# Patient Record
Sex: FEMALE | Race: WHITE
[De-identification: names, ages, dates, MRNs, and addresses within clinical notes are randomized per-mention and may not be internally consistent; named-entity substitution may affect disease eponyms.]

---

## 2017-09-24 ENCOUNTER — HOSPITAL ENCOUNTER (EMERGENCY)
Dept: HOSPITAL 8 - ED | Age: 27
Discharge: HOME | End: 2017-09-24
Payer: COMMERCIAL

## 2017-09-24 VITALS — DIASTOLIC BLOOD PRESSURE: 96 MMHG | SYSTOLIC BLOOD PRESSURE: 137 MMHG

## 2017-09-24 VITALS — BODY MASS INDEX: 23.21 KG/M2 | HEIGHT: 66 IN | WEIGHT: 144.4 LBS
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
## 2017-09-24 DIAGNOSIS — F32.9: ICD-10-CM
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
## 2017-09-24 DIAGNOSIS — S00.12XA: Primary | ICD-10-CM
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
## 2017-09-24 PROCEDURE — 70486 CT MAXILLOFACIAL W/O DYE: CPT

## 2017-09-24 PROCEDURE — 99284 EMERGENCY DEPT VISIT MOD MDM: CPT

## 2018-08-06 ENCOUNTER — HOSPITAL ENCOUNTER (OUTPATIENT)
Dept: RADIOLOGY | Facility: MEDICAL CENTER | Age: 28
End: 2018-08-06
Attending: NEUROLOGICAL SURGERY
Payer: COMMERCIAL

## 2018-08-06 DIAGNOSIS — R51.9 FACIAL PAIN: ICD-10-CM

## 2018-08-06 PROCEDURE — 70551 MRI BRAIN STEM W/O DYE: CPT

## 2018-08-06 PROCEDURE — 70540 MRI ORBIT/FACE/NECK W/O DYE: CPT

## 2018-08-10 ENCOUNTER — HOSPITAL ENCOUNTER (OUTPATIENT)
Facility: MEDICAL CENTER | Age: 28
End: 2018-08-10
Attending: PHYSICIAN ASSISTANT
Payer: COMMERCIAL

## 2018-08-10 ENCOUNTER — OFFICE VISIT (OUTPATIENT)
Dept: URGENT CARE | Facility: CLINIC | Age: 28
End: 2018-08-10
Payer: COMMERCIAL

## 2018-08-10 VITALS
OXYGEN SATURATION: 97 % | DIASTOLIC BLOOD PRESSURE: 76 MMHG | TEMPERATURE: 98.3 F | WEIGHT: 146.4 LBS | HEART RATE: 124 BPM | SYSTOLIC BLOOD PRESSURE: 120 MMHG | RESPIRATION RATE: 16 BRPM | BODY MASS INDEX: 24.36 KG/M2

## 2018-08-10 DIAGNOSIS — N76.0 ACUTE VAGINITIS: ICD-10-CM

## 2018-08-10 DIAGNOSIS — B00.9 HERPES INFECTION: ICD-10-CM

## 2018-08-10 LAB
APPEARANCE UR: CLEAR
BILIRUB UR STRIP-MCNC: NORMAL MG/DL
COLOR UR AUTO: YELLOW
GLUCOSE UR STRIP.AUTO-MCNC: NORMAL MG/DL
KETONES UR STRIP.AUTO-MCNC: 15 MG/DL
LEUKOCYTE ESTERASE UR QL STRIP.AUTO: NORMAL
NITRITE UR QL STRIP.AUTO: NORMAL
PH UR STRIP.AUTO: 6 [PH] (ref 5–8)
PROT UR QL STRIP: NORMAL MG/DL
RBC UR QL AUTO: NORMAL
SP GR UR STRIP.AUTO: 1.02
UROBILINOGEN UR STRIP-MCNC: NORMAL MG/DL

## 2018-08-10 PROCEDURE — 99214 OFFICE O/P EST MOD 30 MIN: CPT | Performed by: PHYSICIAN ASSISTANT

## 2018-08-10 PROCEDURE — 87086 URINE CULTURE/COLONY COUNT: CPT

## 2018-08-10 PROCEDURE — 87480 CANDIDA DNA DIR PROBE: CPT

## 2018-08-10 PROCEDURE — 87510 GARDNER VAG DNA DIR PROBE: CPT

## 2018-08-10 PROCEDURE — 87660 TRICHOMONAS VAGIN DIR PROBE: CPT

## 2018-08-10 PROCEDURE — 87491 CHLMYD TRACH DNA AMP PROBE: CPT

## 2018-08-10 PROCEDURE — 87529 HSV DNA AMP PROBE: CPT | Mod: 91

## 2018-08-10 PROCEDURE — 87529 HSV DNA AMP PROBE: CPT

## 2018-08-10 PROCEDURE — 87591 N.GONORRHOEAE DNA AMP PROB: CPT

## 2018-08-10 PROCEDURE — 81002 URINALYSIS NONAUTO W/O SCOPE: CPT | Performed by: PHYSICIAN ASSISTANT

## 2018-08-10 RX ORDER — AZITHROMYCIN 500 MG/1
1000 TABLET, FILM COATED ORAL ONCE
Qty: 2 TAB | Refills: 0 | Status: SHIPPED | OUTPATIENT
Start: 2018-08-10 | End: 2018-08-10

## 2018-08-10 RX ORDER — CEFTRIAXONE 1 G/1
250 INJECTION, POWDER, FOR SOLUTION INTRAMUSCULAR; INTRAVENOUS ONCE
Status: COMPLETED | OUTPATIENT
Start: 2018-08-10 | End: 2018-08-10

## 2018-08-10 RX ORDER — VALACYCLOVIR HYDROCHLORIDE 1 G/1
1000 TABLET, FILM COATED ORAL 2 TIMES DAILY
Qty: 14 TAB | Refills: 0 | Status: SHIPPED | OUTPATIENT
Start: 2018-08-10 | End: 2018-08-17

## 2018-08-10 RX ADMIN — CEFTRIAXONE 250 MG: 1 INJECTION, POWDER, FOR SOLUTION INTRAMUSCULAR; INTRAVENOUS at 17:09

## 2018-08-10 ASSESSMENT — ENCOUNTER SYMPTOMS
VOMITING: 0
CHILLS: 0
DIARRHEA: 0
ABDOMINAL PAIN: 0
FEVER: 0
NAUSEA: 0

## 2018-08-10 ASSESSMENT — PAIN SCALES - GENERAL: PAINLEVEL: 5=MODERATE PAIN

## 2018-08-10 NOTE — PROGRESS NOTES
Subjective:   Brittany Parker is a 28 y.o. female who presents for Rash (noticed them on wednesday, bumps outside of vaginal area )   2 day history painful lesion around vagina. Patient denies new sexual partners. She is with a partner that she has been with for quite some time. She has not noticed any lesions on the penis. They do not use condoms.    The patient denies any vaginal discharge, abdominal pain, , urgency or frequency. She does report burning with urination. Denies fever.        Rash   This is a new problem. Pertinent negatives include no diarrhea, fever or vomiting.     Review of Systems   Constitutional: Negative for chills, fever and malaise/fatigue.   Gastrointestinal: Negative for abdominal pain, diarrhea, nausea and vomiting.   Genitourinary: Negative for dysuria, frequency and urgency.   Skin: Positive for rash.   All other systems reviewed and are negative.    No Known Allergies     Objective:   /76   Pulse (!) 124   Temp 36.8 °C (98.3 °F)   Resp 16   Wt 66.4 kg (146 lb 6.4 oz)   SpO2 97%   Breastfeeding? No   BMI 24.36 kg/m²   Physical Exam   Constitutional: She is oriented to person, place, and time. She appears well-developed and well-nourished.   HENT:   Head: Normocephalic and atraumatic.   Right Ear: External ear normal.   Left Ear: External ear normal.   Nose: Nose normal.   Mouth/Throat: Oropharynx is clear and moist. No oropharyngeal exudate.   Eyes: Pupils are equal, round, and reactive to light. Conjunctivae and EOM are normal.   Neck: Normal range of motion. Neck supple.   Cardiovascular: Normal rate, regular rhythm and normal heart sounds.  Exam reveals no gallop and no friction rub.    No murmur heard.  Pulmonary/Chest: Effort normal and breath sounds normal. No respiratory distress. She has no wheezes. She has no rales.   Abdominal: Soft. Bowel sounds are normal. She exhibits no distension and no mass. There is no tenderness. There is no rebound and no guarding.      Genitourinary: Rectum normal and uterus normal.       There is lesion on the right labia. Cervix exhibits motion tenderness and discharge. Right adnexum displays no mass, no tenderness and no fullness. Left adnexum displays no mass and no tenderness. There is tenderness in the vagina. Vaginal discharge found.   Genitourinary Comments: Multiple vesicular lesions about the labia and just inside the vagina  Copious yellow foul odor vaginal discharge noted as well as from the cervix.   Musculoskeletal: Normal range of motion. She exhibits no edema, tenderness or deformity.   Lymphadenopathy:     She has no cervical adenopathy.   Neurological: She is alert and oriented to person, place, and time.   Skin: Skin is warm and dry. No rash noted.   Psychiatric: She has a normal mood and affect. Judgment normal.           Assessment/Plan:   Assessment    1. Herpes infection  - RPR  - HIV AG/AB COMBO ASSAY SCREENING; Future  - HEPATITIS PANEL ACUTE (4 COMPONENTS)  - valacyclovir (VALTREX) 1 GM Tab; Take 1 Tab by mouth 2 times a day for 7 days.  Dispense: 14 Tab; Refill: 0  - POCT Urinalysis  - URINE CULTURE(NEW); Future  - VAGINAL PATHOGENS DNA PANEL; Future  - HERPES SCREEN VIRAL CULTURE    Physical exam findings are consistent with herpes simplex virus. Viral culture obtained. She also does have significant vaginal discharge concerning for possible chlamydia or gonorrhea. Swabs are obtained for identification. Patient will be treated with Valtrex as well as a one-time dose of Rocephin and 1 g of Zithromax. She is encouraged to use condoms until results are available. Printed information regarding herpes virus infection given. She does inquire about the use of tampons with her period and is wondering if she could just give her. Recommend starting her new pack of pills. I did review with her that this would be acceptable for this cycle.    She also is agreeable to do labs to check for syphilis, HIV and hepatitis. Ordered and  orders given to patient.      2. Acute vaginitis  - CHLAMYDIA/GC PCR URINE OR SWAB; Future  - RPR  - HIV AG/AB COMBO ASSAY SCREENING; Future  - HEPATITIS PANEL ACUTE (4 COMPONENTS)  - cefTRIAXone (ROCEPHIN) injection 250 mg; 250 mg by Intramuscular route Once.  - azithromycin (ZITHROMAX) 500 MG tablet; Take 2 Tabs by mouth Once for 1 dose.  Dispense: 2 Tab; Refill: 0  - POCT Urinalysis  - URINE CULTURE(NEW); Future  - VAGINAL PATHOGENS DNA PANEL; Future  - HERPES SCREEN VIRAL CULTURE    Differential diagnosis, natural history, supportive care, and indications for immediate follow-up discussed.    If not improving in 3-5 days, F/U with PCP or return to  or sooner if worsens  Strict ER precautions given.    Please note that this note was created using voice recognition speech to text software. Every effort has been made to correct obvious errors.  However, I expect there are errors of grammar and possibly context that were not discovered prior to finalizing the note

## 2018-08-10 NOTE — PATIENT INSTRUCTIONS
Genital Herpes  Genital herpes is a common sexually transmitted infection (STI) that is caused by a virus. The virus is spread from person to person through sexual contact. Infection can cause itching, blisters, and sores in the genital area or rectal area. This is called an outbreak. It affects both men and women.  Genital herpes is particularly concerning for pregnant women because the virus can be passed to the baby during delivery and cause serious problems. Genital herpes is also a concern for people with a weakened defense (immune) system.  Symptoms of genital herpes may last several days and then go away. However, the virus remains in your body, so you may have more outbreaks of symptoms in the future. The time between outbreaks varies and can be months or years.  CAUSES  Genital herpes is caused by a virus called herpes simplex virus (HSV) type 2 or HSV type 1. These viruses are contagious and are most often spread through sexual contact with an infected person. Sexual contact includes vaginal, anal, and oral sex.  RISK FACTORS  Risk factors for genital herpes include:  · Being sexually active with multiple partners.  · Having unprotected sex.  SIGNS AND SYMPTOMS  Symptoms may include:  · Pain and itching in the genital area or rectal area.  · Small red bumps that turn into blisters and then turn into sores.  · Flu-like symptoms, including:  ¨ Fever.  ¨ Body aches.  · Painful urination.  · Vaginal discharge.  DIAGNOSIS  Genital herpes may be diagnosed by:  · Physical exam.  · Blood test.  · Fluid culture test from an open sore.  TREATMENT  There is no cure for genital herpes. Oral antiviral medicines may be used to speed up healing and to help prevent the return of symptoms. These medicines can also help to reduce the spread of the virus to sexual partners.  HOME CARE INSTRUCTIONS  · Keep the affected areas dry and clean.  · Take medicines only as directed by your health care provider.  · Do not have sexual  contact during active infections. Genital herpes is contagious.  · Practice safe sex. Latex condoms and female condoms may help to prevent the spread of the herpes virus.  · Avoid rubbing or touching the blisters and sores. If you do touch the blister or sores:  ¨ Wash your hands thoroughly.  ¨ Do not touch your eyes afterward.  · If you become pregnant, tell your health care provider if you have had genital herpes.  · Keep all follow-up visits as directed by your health care provider. This is important.  PREVENTION  · Use condoms. Although anyone can contract genital herpes during sexual contact even with the use of a condom, a condom can provide some protection.  · Avoid having multiple sexual partners.  · Talk to your sexual partner about any symptoms and past history that either of you may have.  · Get tested before you have sex. Ask your partner to do the same.  · Recognize the symptoms of genital herpes. Do not have sexual contact if you notice these symptoms.  SEEK MEDICAL CARE IF:  · Your symptoms are not improving with medicine.  · Your symptoms return.  · You have new symptoms.  · You have a fever.  · You have abdominal pain.  · You have redness, swelling, or pain in your eye.  MAKE SURE YOU:  · Understand these instructions.  · Will watch your condition.  · Will get help right away if you are not doing well or get worse.  This information is not intended to replace advice given to you by your health care provider. Make sure you discuss any questions you have with your health care provider.  Document Released: 12/15/2001 Document Revised: 01/08/2016 Document Reviewed: 05/05/2015  [employer] Interactive Patient Education © 2017 [employer]

## 2018-08-11 DIAGNOSIS — B00.9 HERPES INFECTION: ICD-10-CM

## 2018-08-11 DIAGNOSIS — N76.0 ACUTE VAGINITIS: ICD-10-CM

## 2018-08-11 LAB
C TRACH DNA SPEC QL NAA+PROBE: NEGATIVE
CANDIDA DNA VAG QL PROBE+SIG AMP: NEGATIVE
G VAGINALIS DNA VAG QL PROBE+SIG AMP: POSITIVE
N GONORRHOEA DNA SPEC QL NAA+PROBE: NEGATIVE
SPECIMEN SOURCE: NORMAL
T VAGINALIS DNA VAG QL PROBE+SIG AMP: NEGATIVE

## 2018-08-11 PROCEDURE — 87529 HSV DNA AMP PROBE: CPT

## 2018-08-13 LAB
BACTERIA UR CULT: NORMAL
SIGNIFICANT IND 70042: NORMAL
SITE SITE: NORMAL
SOURCE SOURCE: NORMAL

## 2018-08-14 RX ORDER — CLINDAMYCIN HYDROCHLORIDE 300 MG/1
300 CAPSULE ORAL 2 TIMES DAILY
Qty: 14 CAP | Refills: 0 | Status: SHIPPED | OUTPATIENT
Start: 2018-08-14 | End: 2018-08-21

## 2018-08-15 LAB — MISCELLANEOUS LAB RESULT MISCLAB: ABNORMAL

## 2018-08-16 LAB
HSV1 DNA CSF QL NAA+PROBE: NOT DETECTED
HSV2 DNA CSF QL NAA+PROBE: DETECTED
SPECIMEN SOURCE: ABNORMAL

## 2018-08-17 ENCOUNTER — TELEPHONE (OUTPATIENT)
Dept: URGENT CARE | Facility: PHYSICIAN GROUP | Age: 28
End: 2018-08-17

## 2018-08-17 NOTE — TELEPHONE ENCOUNTER
Contacted patient with results of viral culture + for HSV 2. She is feeling better with the Valtrex. Results of RPR, HIV and Hepatitis panel are still pending. Will ask staff to check on the status of this.

## 2021-03-04 ENCOUNTER — TELEMEDICINE (OUTPATIENT)
Dept: MEDICAL GROUP | Facility: MEDICAL CENTER | Age: 31
End: 2021-03-04
Attending: NURSE PRACTITIONER

## 2021-03-04 DIAGNOSIS — Z76.89 DIAGNOSTIC INTERVIEW ENCOUNTER: ICD-10-CM

## 2021-03-04 PROCEDURE — 99999 PR NO CHARGE: CPT | Mod: 95 | Performed by: NURSE PRACTITIONER

## 2021-03-05 ENCOUNTER — HOSPITAL ENCOUNTER (INPATIENT)
Dept: HOSPITAL 8 - LDOP | Age: 31
LOS: 2 days | Discharge: HOME | End: 2021-03-07
Attending: OBSTETRICS & GYNECOLOGY | Admitting: OBSTETRICS & GYNECOLOGY
Payer: MEDICAID

## 2021-03-05 VITALS — BODY MASS INDEX: 26.89 KG/M2 | HEIGHT: 66 IN | WEIGHT: 167.33 LBS

## 2021-03-05 VITALS — SYSTOLIC BLOOD PRESSURE: 125 MMHG | DIASTOLIC BLOOD PRESSURE: 83 MMHG

## 2021-03-05 VITALS — DIASTOLIC BLOOD PRESSURE: 76 MMHG | SYSTOLIC BLOOD PRESSURE: 109 MMHG

## 2021-03-05 DIAGNOSIS — Z3A.39: ICD-10-CM

## 2021-03-05 LAB
BASOPHILS # BLD AUTO: 0.3 X10^3/UL (ref 0–0.1)
BASOPHILS NFR BLD AUTO: 1 % (ref 0–1)
EOSINOPHIL # BLD AUTO: 0.1 X10^3/UL (ref 0–0.4)
EOSINOPHIL NFR BLD AUTO: 0 % (ref 1–7)
ERYTHROCYTE [DISTWIDTH] IN BLOOD BY AUTOMATED COUNT: 13.5 % (ref 9.6–15.2)
LYMPHOCYTES # BLD AUTO: 2.9 X10^3/UL (ref 1–3.4)
LYMPHOCYTES NFR BLD AUTO: 14 % (ref 22–44)
MCH RBC QN AUTO: 30.4 PG (ref 27–34.8)
MCHC RBC AUTO-ENTMCNC: 34.6 G/DL (ref 32.4–35.8)
MD: (no result)
MICROSCOPIC: (no result)
MONOCYTES # BLD AUTO: 0.9 X10^3/UL (ref 0.2–0.8)
MONOCYTES NFR BLD AUTO: 5 % (ref 2–9)
NEUTROPHILS # BLD AUTO: 16.2 X10^3/UL (ref 1.8–6.8)
NEUTROPHILS NFR BLD AUTO: 79 % (ref 42–75)
PLATELET # BLD AUTO: 324 X10^3/UL (ref 130–400)
PMV BLD AUTO: 8.4 FL (ref 7.4–10.4)
RBC # BLD AUTO: 4.23 X10^6/UL (ref 3.82–5.3)

## 2021-03-05 PROCEDURE — 85025 COMPLETE CBC W/AUTO DIFF WBC: CPT

## 2021-03-05 PROCEDURE — 86592 SYPHILIS TEST NON-TREP QUAL: CPT

## 2021-03-05 PROCEDURE — 81001 URINALYSIS AUTO W/SCOPE: CPT

## 2021-03-05 PROCEDURE — 87635 SARS-COV-2 COVID-19 AMP PRB: CPT

## 2021-03-05 PROCEDURE — 80307 DRUG TEST PRSMV CHEM ANLYZR: CPT

## 2021-03-05 PROCEDURE — 00HU33Z INSERTION OF INFUSION DEVICE INTO SPINAL CANAL, PERCUTANEOUS APPROACH: ICD-10-PCS | Performed by: OBSTETRICS & GYNECOLOGY

## 2021-03-05 PROCEDURE — 3E0R3BZ INTRODUCTION OF ANESTHETIC AGENT INTO SPINAL CANAL, PERCUTANEOUS APPROACH: ICD-10-PCS | Performed by: OBSTETRICS & GYNECOLOGY

## 2021-03-05 PROCEDURE — 86900 BLOOD TYPING SEROLOGIC ABO: CPT

## 2021-03-05 PROCEDURE — 36415 COLL VENOUS BLD VENIPUNCTURE: CPT

## 2021-03-05 PROCEDURE — 86850 RBC ANTIBODY SCREEN: CPT

## 2021-03-05 PROCEDURE — 10907ZC DRAINAGE OF AMNIOTIC FLUID, THERAPEUTIC FROM PRODUCTS OF CONCEPTION, VIA NATURAL OR ARTIFICIAL OPENING: ICD-10-PCS | Performed by: OBSTETRICS & GYNECOLOGY

## 2021-03-05 PROCEDURE — 87086 URINE CULTURE/COLONY COUNT: CPT

## 2021-03-05 RX ADMIN — SODIUM CHLORIDE, SODIUM LACTATE, POTASSIUM CHLORIDE, AND CALCIUM CHLORIDE SCH MLS/HR: .6; .31; .03; .02 INJECTION, SOLUTION INTRAVENOUS at 12:17

## 2021-03-05 RX ADMIN — SODIUM CHLORIDE, SODIUM LACTATE, POTASSIUM CHLORIDE, AND CALCIUM CHLORIDE SCH MLS/HR: .6; .31; .03; .02 INJECTION, SOLUTION INTRAVENOUS at 12:49

## 2021-03-05 RX ADMIN — Medication SCH MLS/HR: at 22:30

## 2021-03-06 VITALS — SYSTOLIC BLOOD PRESSURE: 119 MMHG | DIASTOLIC BLOOD PRESSURE: 78 MMHG

## 2021-03-06 VITALS — DIASTOLIC BLOOD PRESSURE: 77 MMHG | SYSTOLIC BLOOD PRESSURE: 110 MMHG

## 2021-03-06 VITALS — SYSTOLIC BLOOD PRESSURE: 126 MMHG | DIASTOLIC BLOOD PRESSURE: 88 MMHG

## 2021-03-06 VITALS — DIASTOLIC BLOOD PRESSURE: 87 MMHG | SYSTOLIC BLOOD PRESSURE: 144 MMHG

## 2021-03-06 VITALS — DIASTOLIC BLOOD PRESSURE: 87 MMHG | SYSTOLIC BLOOD PRESSURE: 132 MMHG

## 2021-03-06 LAB
BASOPHILS # BLD AUTO: 0.1 X10^3/UL (ref 0–0.1)
BASOPHILS NFR BLD AUTO: 0 % (ref 0–1)
EOSINOPHIL # BLD AUTO: 0.1 X10^3/UL (ref 0–0.4)
EOSINOPHIL NFR BLD AUTO: 0 % (ref 1–7)
ERYTHROCYTE [DISTWIDTH] IN BLOOD BY AUTOMATED COUNT: 13.2 % (ref 9.6–15.2)
LYMPHOCYTES # BLD AUTO: 3.6 X10^3/UL (ref 1–3.4)
LYMPHOCYTES NFR BLD AUTO: 16 % (ref 22–44)
MCH RBC QN AUTO: 29.7 PG (ref 27–34.8)
MCHC RBC AUTO-ENTMCNC: 33.6 G/DL (ref 32.4–35.8)
MD: (no result)
MONOCYTES # BLD AUTO: 1.5 X10^3/UL (ref 0.2–0.8)
MONOCYTES NFR BLD AUTO: 7 % (ref 2–9)
NEUTROPHILS # BLD AUTO: 16.9 X10^3/UL (ref 1.8–6.8)
NEUTROPHILS NFR BLD AUTO: 76 % (ref 42–75)
PLATELET # BLD AUTO: 276 X10^3/UL (ref 130–400)
PMV BLD AUTO: 8.5 FL (ref 7.4–10.4)
RBC # BLD AUTO: 3.96 X10^6/UL (ref 3.82–5.3)

## 2021-03-06 RX ADMIN — IBUPROFEN PRN MG: 600 TABLET ORAL at 16:25

## 2021-03-06 RX ADMIN — Medication SCH MLS/HR: at 08:30

## 2021-03-06 RX ADMIN — IBUPROFEN PRN MG: 600 TABLET ORAL at 22:54

## 2021-03-06 RX ADMIN — PRENATAL VIT W/ FE FUMARATE-FA TAB 27-0.8 MG SCH EACH: 27-0.8 TAB at 09:27

## 2021-03-06 RX ADMIN — Medication SCH MLS/HR: at 18:30

## 2021-03-06 RX ADMIN — IBUPROFEN PRN MG: 600 TABLET ORAL at 09:27

## 2021-03-06 RX ADMIN — DOCUSATE SODIUM PRN MG: 100 CAPSULE, LIQUID FILLED ORAL at 22:54

## 2021-03-07 VITALS — SYSTOLIC BLOOD PRESSURE: 130 MMHG | DIASTOLIC BLOOD PRESSURE: 87 MMHG

## 2021-03-07 RX ADMIN — DOCUSATE SODIUM PRN MG: 100 CAPSULE, LIQUID FILLED ORAL at 08:11

## 2021-03-07 RX ADMIN — Medication SCH MLS/HR: at 01:48

## 2021-03-07 RX ADMIN — IBUPROFEN PRN MG: 600 TABLET ORAL at 11:00

## 2021-03-07 RX ADMIN — IBUPROFEN PRN MG: 600 TABLET ORAL at 04:46

## 2021-03-07 RX ADMIN — PRENATAL VIT W/ FE FUMARATE-FA TAB 27-0.8 MG SCH EACH: 27-0.8 TAB at 08:11

## 2021-04-14 ENCOUNTER — HOSPITAL ENCOUNTER (EMERGENCY)
Facility: MEDICAL CENTER | Age: 31
End: 2021-04-14
Attending: EMERGENCY MEDICINE
Payer: MEDICAID

## 2021-04-14 VITALS
HEIGHT: 66 IN | WEIGHT: 149.03 LBS | DIASTOLIC BLOOD PRESSURE: 106 MMHG | RESPIRATION RATE: 16 BRPM | HEART RATE: 84 BPM | SYSTOLIC BLOOD PRESSURE: 131 MMHG | TEMPERATURE: 97.2 F | BODY MASS INDEX: 23.95 KG/M2 | OXYGEN SATURATION: 97 %

## 2021-04-14 DIAGNOSIS — B00.89 HERPETIC WHITLOW: ICD-10-CM

## 2021-04-14 PROCEDURE — 99282 EMERGENCY DEPT VISIT SF MDM: CPT

## 2021-04-14 RX ORDER — ACETAMINOPHEN 325 MG/1
650 TABLET ORAL EVERY 4 HOURS PRN
Status: SHIPPED | COMMUNITY
End: 2022-04-28

## 2021-04-14 RX ORDER — DIPHENHYDRAMINE HCL 25 MG
50 TABLET ORAL EVERY 6 HOURS PRN
Status: SHIPPED | COMMUNITY
End: 2022-04-28

## 2021-04-14 ASSESSMENT — PAIN DESCRIPTION - PAIN TYPE: TYPE: ACUTE PAIN

## 2021-04-14 NOTE — ED TRIAGE NOTES
Pt comes in c/o R middle finger pain and swelling that started yesterday  Unknown cause  Noted blister to lower part of finger  Denies injury    Pt is breast feeding and is concerned

## 2021-04-14 NOTE — ED PROVIDER NOTES
ED Provider Note    CHIEF COMPLAINT  Chief Complaint   Patient presents with   • Digit Pain     R middle finger started yesterday  unknown cause of pain and swelling  blister type area noted   denies injury        HPI  Brittany Parker is a 30 y.o. female who presents with blisters on her right third finger.  The patient states this started a couple of days ago.  She states initially she had some small blisters on the palmar aspect of her right third proximal phalanx.  Subsequently she had significant increase in pain.  The patient has not had any associated fevers.  She has not had any vomiting.  She is otherwise healthy.  She is concerned as she is currently breast-feeding.    REVIEW OF SYSTEMS  See HPI for further details. All other systems are negative.     PAST MEDICAL HISTORY  No past medical history on file.    FAMILY HISTORY  [unfilled]    SOCIAL HISTORY  Social History     Socioeconomic History   • Marital status: Single     Spouse name: Not on file   • Number of children: Not on file   • Years of education: Not on file   • Highest education level: Not on file   Occupational History   • Not on file   Tobacco Use   • Smoking status: Never Smoker   • Smokeless tobacco: Never Used   Substance and Sexual Activity   • Alcohol use: Yes   • Drug use: No   • Sexual activity: Not on file   Other Topics Concern   • Not on file   Social History Narrative   • Not on file     Social Determinants of Health     Financial Resource Strain:    • Difficulty of Paying Living Expenses:    Food Insecurity:    • Worried About Running Out of Food in the Last Year:    • Ran Out of Food in the Last Year:    Transportation Needs:    • Lack of Transportation (Medical):    • Lack of Transportation (Non-Medical):    Physical Activity:    • Days of Exercise per Week:    • Minutes of Exercise per Session:    Stress:    • Feeling of Stress :    Social Connections:    • Frequency of Communication with Friends and Family:    • Frequency of  "Social Gatherings with Friends and Family:    • Attends Synagogue Services:    • Active Member of Clubs or Organizations:    • Attends Club or Organization Meetings:    • Marital Status:    Intimate Partner Violence:    • Fear of Current or Ex-Partner:    • Emotionally Abused:    • Physically Abused:    • Sexually Abused:        SURGICAL HISTORY  No past surgical history on file.    CURRENT MEDICATIONS  Home Medications     Reviewed by Giulia Sen R.N. (Registered Nurse) on 04/14/21 at 1431  Med List Status: Partial   Medication Last Dose Status   Norethin Ace-Eth Estrad-FE (LOESTRIN 24 FE PO)  Active                ALLERGIES  No Known Allergies    PHYSICAL EXAM  VITAL SIGNS: /107   Pulse 73   Temp 36.3 °C (97.3 °F) (Temporal)   Resp 16   Ht 1.664 m (5' 5.5\")   Wt 67.6 kg (149 lb 0.5 oz)   SpO2 98%   BMI 24.42 kg/m²       Constitutional: Well developed, Well nourished, No acute distress, Non-toxic appearance.   HENT: Normocephalic, Atraumatic, Bilateral external ears normal, Oropharynx moist, No oral exudates, Nose normal.   Eyes: PERRLA, EOMI, Conjunctiva normal, No discharge.   Neck: Normal range of motion, No tenderness, Supple, No stridor.   Lymphatic: No lymphadenopathy noted.   Cardiovascular: Normal heart rate, Normal rhythm, No murmurs, No rubs, No gallops.   Thorax & Lungs: Normal breath sounds, No respiratory distress, No wheezing, No chest tenderness.   Abdomen: Bowel sounds normal, Soft, No tenderness, No masses, No pulsatile masses.   Skin: Superficial blisters to the palmar aspect of the right third proximal phalanx with tenderness in this region.  Back: No tenderness, No CVA tenderness.   Extremities: The blisters described above to the right third phalanx extremities otherwise intact distal pulses, No edema, No tenderness, No cyanosis, No clubbing.   Neurologic: Alert & oriented x 3, Normal motor function, Normal sensory function, No focal deficits noted.   Psychiatric: Affect " normal, Judgment normal, Mood normal.     COURSE & MEDICAL DECISION MAKING  Pertinent Labs & Imaging studies reviewed. (See chart for details)  This a 30-year-old female who presents with signs and symptoms consistent with herpetic belia to the right third phalanx.  The patient is breast-feeding.  She is instructed to keep an occlusive dressing on the finger to make sure there is no chance of transmission to the 5-week-old baby at home.  Otherwise the patient does not have any systemic evidence of infection.  This does not appear to be a bacterial infection.  Based on the vesicles that have coalesced to form one vesicle in her history as well as the pain I suspect this is a herpetic belia.  The patient will utilize ice, elevation, and anti-inflammatories.    FINAL IMPRESSION  1.  Herpetic belia right third phalanx    Disposition  The patient will be discharged in stable condition         Electronically signed by: Cristian Akhtar M.D., 4/14/2021 2:41 PM

## 2021-04-24 ENCOUNTER — APPOINTMENT (OUTPATIENT)
Dept: RADIOLOGY | Facility: MEDICAL CENTER | Age: 31
End: 2021-04-24
Attending: EMERGENCY MEDICINE
Payer: MEDICAID

## 2021-04-24 ENCOUNTER — HOSPITAL ENCOUNTER (EMERGENCY)
Facility: MEDICAL CENTER | Age: 31
End: 2021-04-24
Attending: EMERGENCY MEDICINE
Payer: MEDICAID

## 2021-04-24 VITALS
RESPIRATION RATE: 18 BRPM | WEIGHT: 143.3 LBS | BODY MASS INDEX: 23.88 KG/M2 | SYSTOLIC BLOOD PRESSURE: 112 MMHG | HEIGHT: 65 IN | OXYGEN SATURATION: 95 % | DIASTOLIC BLOOD PRESSURE: 76 MMHG | HEART RATE: 71 BPM | TEMPERATURE: 98 F

## 2021-04-24 DIAGNOSIS — N13.2 HYDRONEPHROSIS WITH URINARY OBSTRUCTION DUE TO URETERAL CALCULUS: ICD-10-CM

## 2021-04-24 DIAGNOSIS — R10.9 RIGHT FLANK PAIN: ICD-10-CM

## 2021-04-24 DIAGNOSIS — N23 URETERAL COLIC: ICD-10-CM

## 2021-04-24 DIAGNOSIS — N20.0 NEPHROLITHIASIS: ICD-10-CM

## 2021-04-24 DIAGNOSIS — N20.1 URETEROLITHIASIS: ICD-10-CM

## 2021-04-24 LAB
ALBUMIN SERPL BCP-MCNC: 4.3 G/DL (ref 3.2–4.9)
ALBUMIN/GLOB SERPL: 1.5 G/DL
ALP SERPL-CCNC: 80 U/L (ref 30–99)
ALT SERPL-CCNC: 34 U/L (ref 2–50)
ANION GAP SERPL CALC-SCNC: 13 MMOL/L (ref 7–16)
APPEARANCE UR: CLEAR
AST SERPL-CCNC: 22 U/L (ref 12–45)
BASOPHILS # BLD AUTO: 0.6 % (ref 0–1.8)
BASOPHILS # BLD: 0.07 K/UL (ref 0–0.12)
BILIRUB SERPL-MCNC: 0.5 MG/DL (ref 0.1–1.5)
BILIRUB UR QL STRIP.AUTO: ABNORMAL
BUN SERPL-MCNC: 9 MG/DL (ref 8–22)
CALCIUM SERPL-MCNC: 9.2 MG/DL (ref 8.4–10.2)
CHLORIDE SERPL-SCNC: 104 MMOL/L (ref 96–112)
CO2 SERPL-SCNC: 23 MMOL/L (ref 20–33)
COLOR UR: YELLOW
CREAT SERPL-MCNC: 0.8 MG/DL (ref 0.5–1.4)
EOSINOPHIL # BLD AUTO: 0.03 K/UL (ref 0–0.51)
EOSINOPHIL NFR BLD: 0.2 % (ref 0–6.9)
EPI CELLS #/AREA URNS HPF: ABNORMAL /HPF
ERYTHROCYTE [DISTWIDTH] IN BLOOD BY AUTOMATED COUNT: 39.9 FL (ref 35.9–50)
GLOBULIN SER CALC-MCNC: 2.9 G/DL (ref 1.9–3.5)
GLUCOSE SERPL-MCNC: 101 MG/DL (ref 65–99)
GLUCOSE UR STRIP.AUTO-MCNC: NEGATIVE MG/DL
HCG SERPL QL: NEGATIVE
HCT VFR BLD AUTO: 40.5 % (ref 37–47)
HGB BLD-MCNC: 13.7 G/DL (ref 12–16)
IMM GRANULOCYTES # BLD AUTO: 0.04 K/UL (ref 0–0.11)
IMM GRANULOCYTES NFR BLD AUTO: 0.3 % (ref 0–0.9)
KETONES UR STRIP.AUTO-MCNC: 15 MG/DL
LEUKOCYTE ESTERASE UR QL STRIP.AUTO: NEGATIVE
LIPASE SERPL-CCNC: 23 U/L (ref 7–58)
LYMPHOCYTES # BLD AUTO: 2.9 K/UL (ref 1–4.8)
LYMPHOCYTES NFR BLD: 23.6 % (ref 22–41)
MCH RBC QN AUTO: 29.9 PG (ref 27–33)
MCHC RBC AUTO-ENTMCNC: 33.8 G/DL (ref 33.6–35)
MCV RBC AUTO: 88.4 FL (ref 81.4–97.8)
MICRO URNS: ABNORMAL
MONOCYTES # BLD AUTO: 0.47 K/UL (ref 0–0.85)
MONOCYTES NFR BLD AUTO: 3.8 % (ref 0–13.4)
MUCOUS THREADS #/AREA URNS HPF: ABNORMAL /HPF
NEUTROPHILS # BLD AUTO: 8.79 K/UL (ref 2–7.15)
NEUTROPHILS NFR BLD: 71.5 % (ref 44–72)
NITRITE UR QL STRIP.AUTO: NEGATIVE
NRBC # BLD AUTO: 0 K/UL
NRBC BLD-RTO: 0 /100 WBC
PH UR STRIP.AUTO: 6.5 [PH] (ref 5–8)
PLATELET # BLD AUTO: 361 K/UL (ref 164–446)
PMV BLD AUTO: 9.4 FL (ref 9–12.9)
POTASSIUM SERPL-SCNC: 3.7 MMOL/L (ref 3.6–5.5)
PROT SERPL-MCNC: 7.2 G/DL (ref 6–8.2)
PROT UR QL STRIP: NEGATIVE MG/DL
RBC # BLD AUTO: 4.58 M/UL (ref 4.2–5.4)
RBC # URNS HPF: ABNORMAL /HPF
RBC UR QL AUTO: ABNORMAL
SODIUM SERPL-SCNC: 140 MMOL/L (ref 135–145)
SP GR UR STRIP.AUTO: 1.02
WBC # BLD AUTO: 12.3 K/UL (ref 4.8–10.8)
WBC #/AREA URNS HPF: ABNORMAL /HPF

## 2021-04-24 PROCEDURE — 80053 COMPREHEN METABOLIC PANEL: CPT

## 2021-04-24 PROCEDURE — 85025 COMPLETE CBC W/AUTO DIFF WBC: CPT

## 2021-04-24 PROCEDURE — 99284 EMERGENCY DEPT VISIT MOD MDM: CPT

## 2021-04-24 PROCEDURE — 83690 ASSAY OF LIPASE: CPT

## 2021-04-24 PROCEDURE — 700111 HCHG RX REV CODE 636 W/ 250 OVERRIDE (IP): Performed by: EMERGENCY MEDICINE

## 2021-04-24 PROCEDURE — 81001 URINALYSIS AUTO W/SCOPE: CPT

## 2021-04-24 PROCEDURE — 96375 TX/PRO/DX INJ NEW DRUG ADDON: CPT

## 2021-04-24 PROCEDURE — 84703 CHORIONIC GONADOTROPIN ASSAY: CPT

## 2021-04-24 PROCEDURE — 74176 CT ABD & PELVIS W/O CONTRAST: CPT

## 2021-04-24 PROCEDURE — 96374 THER/PROPH/DIAG INJ IV PUSH: CPT

## 2021-04-24 RX ORDER — ONDANSETRON 4 MG/1
4 TABLET, ORALLY DISINTEGRATING ORAL EVERY 6 HOURS PRN
Qty: 10 TABLET | Refills: 0 | Status: SHIPPED | OUTPATIENT
Start: 2021-04-24 | End: 2022-04-28

## 2021-04-24 RX ORDER — ONDANSETRON 2 MG/ML
4 INJECTION INTRAMUSCULAR; INTRAVENOUS ONCE
Status: COMPLETED | OUTPATIENT
Start: 2021-04-24 | End: 2021-04-24

## 2021-04-24 RX ORDER — KETOROLAC TROMETHAMINE 30 MG/ML
30 INJECTION, SOLUTION INTRAMUSCULAR; INTRAVENOUS ONCE
Status: COMPLETED | OUTPATIENT
Start: 2021-04-24 | End: 2021-04-24

## 2021-04-24 RX ORDER — KETOROLAC TROMETHAMINE 10 MG/1
10 TABLET, FILM COATED ORAL EVERY 4 HOURS PRN
Qty: 10 TABLET | Refills: 0 | Status: SHIPPED | OUTPATIENT
Start: 2021-04-24 | End: 2022-04-28

## 2021-04-24 RX ORDER — HYDROCODONE BITARTRATE AND ACETAMINOPHEN 5; 325 MG/1; MG/1
1 TABLET ORAL EVERY 4 HOURS PRN
Qty: 10 TABLET | Refills: 0 | Status: SHIPPED | OUTPATIENT
Start: 2021-04-24 | End: 2021-04-27

## 2021-04-24 RX ADMIN — FENTANYL CITRATE 50 MCG: 50 INJECTION, SOLUTION INTRAMUSCULAR; INTRAVENOUS at 19:09

## 2021-04-24 RX ADMIN — KETOROLAC TROMETHAMINE 30 MG: 30 INJECTION, SOLUTION INTRAMUSCULAR at 19:27

## 2021-04-24 RX ADMIN — ONDANSETRON 4 MG: 2 INJECTION INTRAMUSCULAR; INTRAVENOUS at 19:08

## 2021-04-24 ASSESSMENT — PAIN DESCRIPTION - PAIN TYPE: TYPE: ACUTE PAIN

## 2021-04-24 ASSESSMENT — PAIN DESCRIPTION - DESCRIPTORS: DESCRIPTORS: ACHING

## 2021-04-25 NOTE — ED NOTES
Pt provided with discharge paper work and prescription education. Pt declines any questions at this time. PT to ambulate out of ER without complication.

## 2021-04-25 NOTE — DISCHARGE INSTRUCTIONS
Follow-up with primary care next week for reevaluation, to establish care referral to urology for further management of kidney stones.    Toradol every 6 hours as needed for discomfort.  Norco every 4-6 hours as needed for breakthrough pain.  Zofran, oral dissolving tablet, every 4-6 hours as needed for nausea or vomiting.    Encourage oral fluid hydration.  Advance diet activity as tolerated.    Return to the emergency department for intractable pain, fever, vomiting or other new concerns.

## 2021-04-25 NOTE — ED TRIAGE NOTES
"Presents complaining of right flank, and RLQ abdominal pain recurring intermittently for approximately a week with associated nausea.  She report a recent, natural child delivery the 7 th of this month.   Chief Complaint   Patient presents with   • Flank Pain   • RLQ Pain     /83   Pulse 66   Temp 36.7 °C (98 °F) (Temporal)   Resp 18   Ht 1.651 m (5' 5\")   Wt 65 kg (143 lb 4.8 oz)   SpO2 96%   BMI 23.85 kg/m²      "

## 2021-04-25 NOTE — ED PROVIDER NOTES
"ED Provider Note    CHIEF COMPLAINT  Chief Complaint   Patient presents with   • Flank Pain   • RLQ Pain       HPI  Brittany Parker is a 30 y.o. female who presents to the emergency department for right flank pain, right lower quadrant abdominal pain.  Patient describes sudden onset at 2 PM, constant since that time, now 10 out of 10, sharp, pressure.  Nausea without vomiting.  No dysuria, hematuria, frequency or urgency.  Patient has urinated without difficulty.    Postpartum 7 weeks.  Normal spontaneous vaginal delivery without other complications.  Breast-feeding.  Does not continue to have bleeding or vaginal discharge.  Denies any pregnancy.    REVIEW OF SYSTEMS  See HPI for further details. All other systems are negative.     PAST MEDICAL HISTORY   Denies    SOCIAL HISTORY  Social History     Tobacco Use   • Smoking status: Never Smoker   • Smokeless tobacco: Never Used   Substance and Sexual Activity   • Alcohol use: Not Currently   • Drug use: No   • Sexual activity: Not on file       SURGICAL HISTORY  patient denies any surgical history    CURRENT MEDICATIONS  Home Medications    **Home medications have not yet been reviewed for this encounter**     Denies    ALLERGIES  No Known Allergies    PHYSICAL EXAM  VITAL SIGNS: /76   Pulse 71   Temp 36.7 °C (98 °F) (Temporal)   Resp 18   Ht 1.651 m (5' 5\")   Wt 65 kg (143 lb 4.8 oz)   SpO2 95%   BMI 23.85 kg/m²   Pulse ox interpretation: I interpret this pulse ox as normal.  Constitutional: Aler.  Mild distress secondary discomfort.  HENT: Normocephalic, atraumatic. Bilateral external ears normal, Nose normal.   Eyes: Pupils are equal and reactive, Conjunctiva normal.   Neck: Normal range of motion, Suppler.   Lymphatic: No lymphadenopathy noted.   Cardiovascular: Regular rate and rhythm, no murmurs. Distal pulses intact.   Thorax & Lungs: Normal breath sounds.  No wheezing/rales/ronchi. No increased work of breathing  Abdomen: Soft, " non-distended.  Mild discomfort in the right lower quadrant without rebound, guarding or peritonitis.  No palpable pulsatile mass.  Mild right CVA tenderness percussion.  Skin: Warm, Dry, No erythema, No rash.   Musculoskeletal: Good range of motion in all major joints.   Neurologic: Alert and orient x4.  Ambulates independently.  Psychiatric: Affect normal, Judgment normal, Mood normal.       DIAGNOSTIC STUDIES / PROCEDURES    LABS  Results for orders placed or performed during the hospital encounter of 04/24/21   CBC WITH DIFFERENTIAL   Result Value Ref Range    WBC 12.3 (H) 4.8 - 10.8 K/uL    RBC 4.58 4.20 - 5.40 M/uL    Hemoglobin 13.7 12.0 - 16.0 g/dL    Hematocrit 40.5 37.0 - 47.0 %    MCV 88.4 81.4 - 97.8 fL    MCH 29.9 27.0 - 33.0 pg    MCHC 33.8 33.6 - 35.0 g/dL    RDW 39.9 35.9 - 50.0 fL    Platelet Count 361 164 - 446 K/uL    MPV 9.4 9.0 - 12.9 fL    Neutrophils-Polys 71.50 44.00 - 72.00 %    Lymphocytes 23.60 22.00 - 41.00 %    Monocytes 3.80 0.00 - 13.40 %    Eosinophils 0.20 0.00 - 6.90 %    Basophils 0.60 0.00 - 1.80 %    Immature Granulocytes 0.30 0.00 - 0.90 %    Nucleated RBC 0.00 /100 WBC    Neutrophils (Absolute) 8.79 (H) 2.00 - 7.15 K/uL    Lymphs (Absolute) 2.90 1.00 - 4.80 K/uL    Monos (Absolute) 0.47 0.00 - 0.85 K/uL    Eos (Absolute) 0.03 0.00 - 0.51 K/uL    Baso (Absolute) 0.07 0.00 - 0.12 K/uL    Immature Granulocytes (abs) 0.04 0.00 - 0.11 K/uL    NRBC (Absolute) 0.00 K/uL   COMP METABOLIC PANEL   Result Value Ref Range    Sodium 140 135 - 145 mmol/L    Potassium 3.7 3.6 - 5.5 mmol/L    Chloride 104 96 - 112 mmol/L    Co2 23 20 - 33 mmol/L    Anion Gap 13.0 7.0 - 16.0    Glucose 101 (H) 65 - 99 mg/dL    Bun 9 8 - 22 mg/dL    Creatinine 0.80 0.50 - 1.40 mg/dL    Calcium 9.2 8.4 - 10.2 mg/dL    AST(SGOT) 22 12 - 45 U/L    ALT(SGPT) 34 2 - 50 U/L    Alkaline Phosphatase 80 30 - 99 U/L    Total Bilirubin 0.5 0.1 - 1.5 mg/dL    Albumin 4.3 3.2 - 4.9 g/dL    Total Protein 7.2 6.0 - 8.2 g/dL     Globulin 2.9 1.9 - 3.5 g/dL    A-G Ratio 1.5 g/dL   LIPASE   Result Value Ref Range    Lipase 23 7 - 58 U/L   URINALYSIS CULTURE, IF INDICATED    Specimen: Urine   Result Value Ref Range    Color Yellow     Character Clear     Specific Gravity 1.025 <1.035    Ph 6.5 5.0 - 8.0    Glucose Negative Negative mg/dL    Ketones 15 (A) Negative mg/dL    Protein Negative Negative mg/dL    Bilirubin Small (A) Negative    Nitrite Negative Negative    Leukocyte Esterase Negative Negative    Occult Blood Large (A) Negative    Micro Urine Req Microscopic    HCG QUAL SERUM   Result Value Ref Range    Beta-Hcg Qualitative Serum Negative Negative   URINE MICROSCOPIC (W/UA)   Result Value Ref Range    WBC 0-2 /hpf    RBC 20-50 (A) /hpf    Epithelial Cells Few Few /hpf    Mucous Threads Moderate /hpf   ESTIMATED GFR   Result Value Ref Range    GFR If African American >60 >60 mL/min/1.73 m 2    GFR If Non African American >60 >60 mL/min/1.73 m 2     RADIOLOGY  CT-RENAL COLIC EVALUATION(A/P W/O)   Final Result      1.  3 mm distal RIGHT ureteral stone with moderate hydronephrosis.   2.  Small nonobstructing RIGHT kidney stones.   3.  Normal appendix.          COURSE & MEDICAL DECISION MAKING  Nursing notes and vital signs were reviewed. (See chart for details)  The patients records were reviewed, history was obtained from the patient;     ED evaluation demonstrates 3 mm right distal ureteral stone with moderate hydronephrosis, this is consistent with the clinical exam for right flank pain as well.  Labs are otherwise quite unrevealing, mild leukocytosis, WBC 12.3 without left shift or bandemia.  No electrolyte derangement, renal function is preserved.  Urinalysis with blood as expected, no evidence for infection.  Pregnancy negative.  Pain is controlled and nearly resolved after fentanyl and Toradol in the emergency department.  Tolerating oral after Zofran without recurrent vomiting.  Hemodynamically stable without fever, tachycardia  or hypotension.  No clinical evidence for sepsis, infected stone or pyelonephritis.    Patient is stable for discharge at this time, anticipatory guidance provided, Toradol, Norco for breakthrough symptoms, Zofran for nausea or vomiting, close follow-up is encouraged, and strict ED return instructions have been detailed. Patient is agreeable to the disposition and plan.    Patient's blood pressure was elevated in the emergency department, and has been referred to primary care for close monitoring.    FINAL IMPRESSION  (N20.1) Ureterolithiasis  (N23) Ureteral colic  (R10.9) Right flank pain  (N13.2) Hydronephrosis with urinary obstruction due to ureteral calculus  (N20.0) Nephrolithiasis      Electronically signed by: Chen Richter D.O., 4/24/2021 7:05 PM      This dictation was created using voice recognition software. The accuracy of the dictation is limited to the abilities of the software. I expect there may be some errors of grammar and possibly content. The nursing notes were reviewed and certain aspects of this information were incorporated into this note.

## 2021-04-25 NOTE — ED NOTES
Pt provided with water for PO challenge    Pt was able to complete cup of water without complication, ERP aware. Pt no up for discharge

## 2021-09-05 ENCOUNTER — HOSPITAL ENCOUNTER (EMERGENCY)
Facility: MEDICAL CENTER | Age: 31
End: 2021-09-05
Attending: EMERGENCY MEDICINE
Payer: MEDICAID

## 2021-09-05 VITALS
SYSTOLIC BLOOD PRESSURE: 124 MMHG | OXYGEN SATURATION: 97 % | DIASTOLIC BLOOD PRESSURE: 83 MMHG | WEIGHT: 150.35 LBS | HEART RATE: 68 BPM | BODY MASS INDEX: 24.16 KG/M2 | TEMPERATURE: 98.4 F | HEIGHT: 66 IN | RESPIRATION RATE: 16 BRPM

## 2021-09-05 DIAGNOSIS — S01.81XA FACIAL LACERATION, INITIAL ENCOUNTER: ICD-10-CM

## 2021-09-05 PROCEDURE — 304217 HCHG IRRIGATION SYSTEM

## 2021-09-05 PROCEDURE — 303747 HCHG EXTRA SUTURE

## 2021-09-05 PROCEDURE — 700101 HCHG RX REV CODE 250: Performed by: EMERGENCY MEDICINE

## 2021-09-05 PROCEDURE — 304999 HCHG REPAIR-SIMPLE/INTERMED LEVEL 1

## 2021-09-05 PROCEDURE — 99282 EMERGENCY DEPT VISIT SF MDM: CPT

## 2021-09-05 RX ORDER — LIDOCAINE HYDROCHLORIDE 10 MG/ML
20 INJECTION, SOLUTION INFILTRATION; PERINEURAL ONCE
Status: COMPLETED | OUTPATIENT
Start: 2021-09-05 | End: 2021-09-05

## 2021-09-05 RX ADMIN — LIDOCAINE HYDROCHLORIDE 20 ML: 10 INJECTION, SOLUTION INFILTRATION; PERINEURAL at 14:45

## 2021-09-05 ASSESSMENT — FIBROSIS 4 INDEX: FIB4 SCORE: 0.3

## 2021-09-05 NOTE — DISCHARGE INSTRUCTIONS
Sutures out in 6 days here or your doctor. Return for redness, pain, swelling, fever or signs of infection.  To minimize scarring keep it out of the sun for 6 months by applying sunscreen or hat. Keep wound clean and dry.

## 2021-09-05 NOTE — ED NOTES
Wound care provided. Patient verbalized understanding to plan of care and discharge information. Patient in stable condition. No signs of distress. Patient pain free. Patient ambulatory out of ED to personal vehicle with stable gait with family.
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
## 2021-09-13 ENCOUNTER — HOSPITAL ENCOUNTER (EMERGENCY)
Facility: MEDICAL CENTER | Age: 31
End: 2021-09-13
Payer: MEDICAID

## 2021-09-13 VITALS
OXYGEN SATURATION: 97 % | HEART RATE: 61 BPM | HEIGHT: 66 IN | BODY MASS INDEX: 23.88 KG/M2 | RESPIRATION RATE: 14 BRPM | TEMPERATURE: 98.7 F | SYSTOLIC BLOOD PRESSURE: 122 MMHG | DIASTOLIC BLOOD PRESSURE: 82 MMHG | WEIGHT: 148.59 LBS

## 2021-09-13 PROCEDURE — 99281 EMR DPT VST MAYX REQ PHY/QHP: CPT

## 2021-09-13 ASSESSMENT — FIBROSIS 4 INDEX: FIB4 SCORE: 0.3

## 2021-09-13 NOTE — ED TRIAGE NOTES
"Chief Complaint   Patient presents with   • Suture Removal     placed on Right side forehead on 09/05/21     /82   Pulse 61   Temp 37.1 °C (98.7 °F) (Temporal)   Resp 14   Ht 1.676 m (5' 6\")   Wt 67.4 kg (148 lb 9.4 oz)   LMP 08/29/2021   SpO2 97%   BMI 23.98 kg/m²     Two sutures removed from above Right eyebrow without difficulty.  No bleeding, drainage or redness noted.  Pt instructed on wound care and return precautions, denied questions/concerns.  Pt ambulated from ED.    "

## 2022-04-28 ENCOUNTER — OFFICE VISIT (OUTPATIENT)
Dept: MEDICAL GROUP | Facility: OTHER | Age: 32
End: 2022-04-28
Payer: MEDICAID

## 2022-04-28 VITALS
RESPIRATION RATE: 16 BRPM | HEART RATE: 98 BPM | OXYGEN SATURATION: 97 % | BODY MASS INDEX: 26.23 KG/M2 | TEMPERATURE: 97.5 F | SYSTOLIC BLOOD PRESSURE: 112 MMHG | HEIGHT: 66 IN | DIASTOLIC BLOOD PRESSURE: 76 MMHG | WEIGHT: 163.2 LBS

## 2022-04-28 DIAGNOSIS — F33.1 MODERATE EPISODE OF RECURRENT MAJOR DEPRESSIVE DISORDER (HCC): ICD-10-CM

## 2022-04-28 DIAGNOSIS — R53.82 CHRONIC FATIGUE: ICD-10-CM

## 2022-04-28 DIAGNOSIS — R10.2 PELVIC PAIN: ICD-10-CM

## 2022-04-28 PROCEDURE — 99204 OFFICE O/P NEW MOD 45 MIN: CPT | Performed by: FAMILY MEDICINE

## 2022-04-28 RX ORDER — SERTRALINE HYDROCHLORIDE 100 MG/1
TABLET, FILM COATED ORAL
Status: ON HOLD | COMMUNITY
Start: 2022-03-28 | End: 2023-07-11

## 2022-04-28 RX ORDER — MINOCYCLINE HYDROCHLORIDE 100 MG/1
CAPSULE ORAL
Status: ON HOLD | COMMUNITY
Start: 2022-03-21 | End: 2023-07-11

## 2022-04-28 RX ORDER — BUPROPION HYDROCHLORIDE 150 MG/1
150 TABLET ORAL EVERY MORNING
Qty: 60 TABLET | Refills: 1 | Status: SHIPPED | OUTPATIENT
Start: 2022-04-28 | End: 2022-07-01 | Stop reason: SDUPTHER

## 2022-04-28 RX ORDER — NORETHINDRONE ACETATE/ETHINYL ESTRADIOL AND FERROUS FUMARATE 1MG-20(21)
KIT ORAL
Status: ON HOLD | COMMUNITY
Start: 2022-02-28 | End: 2023-07-11

## 2022-04-28 ASSESSMENT — FIBROSIS 4 INDEX: FIB4 SCORE: 0.3

## 2022-04-28 ASSESSMENT — PATIENT HEALTH QUESTIONNAIRE - PHQ9
SUM OF ALL RESPONSES TO PHQ QUESTIONS 1-9: 16
5. POOR APPETITE OR OVEREATING: 3 - NEARLY EVERY DAY
CLINICAL INTERPRETATION OF PHQ2 SCORE: 3
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
## 2022-05-11 ENCOUNTER — HOSPITAL ENCOUNTER (OUTPATIENT)
Dept: RADIOLOGY | Facility: MEDICAL CENTER | Age: 32
End: 2022-05-11
Attending: FAMILY MEDICINE
Payer: MEDICAID

## 2022-05-11 DIAGNOSIS — R10.2 PELVIC PAIN: ICD-10-CM

## 2022-05-11 PROCEDURE — 76830 TRANSVAGINAL US NON-OB: CPT

## 2022-06-02 ENCOUNTER — APPOINTMENT (OUTPATIENT)
Dept: MEDICAL GROUP | Facility: OTHER | Age: 32
End: 2022-06-02
Payer: MEDICAID

## 2022-07-01 ENCOUNTER — OFFICE VISIT (OUTPATIENT)
Dept: MEDICAL GROUP | Facility: OTHER | Age: 32
End: 2022-07-01
Payer: MEDICAID

## 2022-07-01 VITALS
TEMPERATURE: 98.2 F | DIASTOLIC BLOOD PRESSURE: 72 MMHG | SYSTOLIC BLOOD PRESSURE: 114 MMHG | BODY MASS INDEX: 26.84 KG/M2 | HEIGHT: 66 IN | WEIGHT: 167 LBS | RESPIRATION RATE: 16 BRPM | OXYGEN SATURATION: 99 % | HEART RATE: 95 BPM

## 2022-07-01 DIAGNOSIS — F33.1 MODERATE EPISODE OF RECURRENT MAJOR DEPRESSIVE DISORDER (HCC): ICD-10-CM

## 2022-07-01 DIAGNOSIS — R10.2 PELVIC PAIN: ICD-10-CM

## 2022-07-01 PROCEDURE — 99213 OFFICE O/P EST LOW 20 MIN: CPT | Performed by: FAMILY MEDICINE

## 2022-07-01 RX ORDER — BUPROPION HYDROCHLORIDE 150 MG/1
150 TABLET ORAL EVERY MORNING
Qty: 60 TABLET | Refills: 1 | Status: SHIPPED
Start: 2022-07-01 | End: 2022-07-26

## 2022-07-01 ASSESSMENT — FIBROSIS 4 INDEX: FIB4 SCORE: 0.31

## 2022-07-01 NOTE — PROGRESS NOTES
"  Subjective:   Brittany Parker is a 32 y.o. female here today to discuss follow-up from previous visit.  She reports she lost her lab slip and was unable to complete her labs.  She was unable to  the bupropion due to some insurance issues and was unsure what to do after that.  She reports some improvement in her mood overall, but does feel like her lack of sex drive is a significant issue in her relationship and her day-to-day life.  She also is feeling like the pelvic pain she was experiencing at the last visit has improved and we reviewed her ultrasound which was grossly normal.    ROS:  See HPI    There is no problem list on file for this patient.      Current medicines (including changes today)  Current Outpatient Medications   Medication Sig Dispense Refill   • buPROPion (WELLBUTRIN XL) 150 MG XL tablet Take 1 Tablet by mouth every morning. After one week, can start taking one PO BID. 60 Tablet 1   • minocycline (MINOCIN) 100 MG Cap      • sertraline (ZOLOFT) 100 MG Tab      • CARLOS FE 1/20 1-20 MG-MCG per tablet        No current facility-administered medications for this visit.     Social History     Tobacco Use   • Smoking status: Never Smoker   • Smokeless tobacco: Never Used   Substance Use Topics   • Alcohol use: Not Currently   • Drug use: Yes     Types: Marijuana     family history is not on file.     Objective:     Vitals:    07/01/22 0808   BP: 114/72   BP Location: Left arm   Patient Position: Sitting   BP Cuff Size: Adult   Pulse: 95   Resp: 16   Temp: 36.8 °C (98.2 °F)   TempSrc: Temporal   SpO2: 99%   Weight: 75.8 kg (167 lb)   Height: 1.676 m (5' 6\")     Body mass index is 26.95 kg/m².     Physical Exam:  Gen: Well developed, well nourished in no acute distress.   Skin: Pink, warm, and dry  HEENT: conjunctiva non-injected, sclera non-icteric. EOMs intact.   Lungs: Effort is normal. Clear to auscultation bilaterally with good excursion.  CV: regular rate and rhythm without " murmur  PSYCH: euthymic mood and reactive affect        Assessment and Plan:   The following treatment plan was discussed:   1. Moderate episode of recurrent major depressive disorder (HCC)  At this point I still feel that bupropion might be a good option for the patient I am unsure why she was unable to pick it up and will have my medical assistant communicate with the pharmacy today.  I have resent the medication and will have her follow-up with me in 1 month.  We reviewed the risks and benefits of this medication and then she is aware of the black box warning.  - buPROPion (WELLBUTRIN XL) 150 MG XL tablet; Take 1 Tablet by mouth every morning. After one week, can start taking one PO BID.  Dispense: 60 Tablet; Refill: 1    2. Pelvic pain  At this point I feel some pelvic physical therapy might do her some good.  She is also experiencing some stress incontinence since the birth of her son.  I placed a referral per below, and also provided her with a written referral in case that is faster.  - Referral to Physical Therapy          Followup: Return in about 1 month (around 8/1/2022).        Please note that this dictation was created using voice recognition software. I have worked with consultants from the vendor as well as technical experts from [employer] to optimize the interface. I have made every reasonable attempt to correct obvious errors, but I expect that there are errors of grammar and possibly content that I did not discover before finalizing the note.

## 2022-07-05 DIAGNOSIS — K52.9 GASTROENTERITIS: ICD-10-CM

## 2022-07-05 RX ORDER — ONDANSETRON 4 MG/1
4-8 TABLET, ORALLY DISINTEGRATING ORAL EVERY 6 HOURS PRN
Qty: 30 TABLET | Refills: 0 | Status: ON HOLD | OUTPATIENT
Start: 2022-07-05 | End: 2023-07-11

## 2022-07-26 ENCOUNTER — TELEPHONE (OUTPATIENT)
Dept: MEDICAL GROUP | Facility: OTHER | Age: 32
End: 2022-07-26
Payer: MEDICAID

## 2022-07-26 DIAGNOSIS — F33.1 MODERATE EPISODE OF RECURRENT MAJOR DEPRESSIVE DISORDER (HCC): ICD-10-CM

## 2022-07-26 RX ORDER — BUPROPION HYDROCHLORIDE 150 MG/1
150 TABLET, EXTENDED RELEASE ORAL 2 TIMES DAILY
Qty: 60 TABLET | Refills: 5 | Status: SHIPPED | OUTPATIENT
Start: 2022-07-26 | End: 2022-09-23

## 2022-07-26 NOTE — TELEPHONE ENCOUNTER
I attempted to submit a Prior Auth for pt's Bupropion but her insurance is requesting a smaller quantity and smaller day supply, they won't cover the current sig, please advise

## 2022-08-03 ENCOUNTER — OFFICE VISIT (OUTPATIENT)
Dept: MEDICAL GROUP | Facility: OTHER | Age: 32
End: 2022-08-03
Payer: MEDICAID

## 2022-08-03 VITALS
BODY MASS INDEX: 27.05 KG/M2 | RESPIRATION RATE: 16 BRPM | OXYGEN SATURATION: 97 % | WEIGHT: 168.31 LBS | DIASTOLIC BLOOD PRESSURE: 80 MMHG | TEMPERATURE: 98.2 F | HEART RATE: 96 BPM | SYSTOLIC BLOOD PRESSURE: 110 MMHG | HEIGHT: 66 IN

## 2022-08-03 DIAGNOSIS — F33.1 MODERATE EPISODE OF RECURRENT MAJOR DEPRESSIVE DISORDER (HCC): ICD-10-CM

## 2022-08-03 PROCEDURE — 99213 OFFICE O/P EST LOW 20 MIN: CPT | Performed by: FAMILY MEDICINE

## 2022-08-03 ASSESSMENT — FIBROSIS 4 INDEX: FIB4 SCORE: 0.31

## 2022-08-03 NOTE — PROGRESS NOTES
"  Subjective:   Brittany Parker is a 32 y.o. female here today to discuss a few issues discussed in more detail below.    ROS:  See HPI    Patient Active Problem List   Diagnosis   • Moderate episode of recurrent major depressive disorder (HCC)       Current medicines (including changes today)  Current Outpatient Medications   Medication Sig Dispense Refill   • buPROPion SR (WELLBUTRIN-SR) 150 MG TABLET SR 12 HR sustained-release tablet Take 1 Tablet by mouth 2 times a day. 60 Tablet 5   • ondansetron (ZOFRAN ODT) 4 MG TABLET DISPERSIBLE Take 1-2 Tablets by mouth every 6 hours as needed for Nausea. 30 Tablet 0   • minocycline (MINOCIN) 100 MG Cap      • sertraline (ZOLOFT) 100 MG Tab      • CARLOS FE 1/20 1-20 MG-MCG per tablet        No current facility-administered medications for this visit.     Social History     Tobacco Use   • Smoking status: Never Smoker   • Smokeless tobacco: Never Used   Substance Use Topics   • Alcohol use: Not Currently   • Drug use: Yes     Types: Marijuana     family history is not on file.     Objective:     Vitals:    08/03/22 0917   BP: 110/80   BP Location: Left arm   Patient Position: Sitting   BP Cuff Size: Adult   Pulse: 96   Resp: 16   Temp: 36.8 °C (98.2 °F)   TempSrc: Temporal   SpO2: 97%   Weight: 76.3 kg (168 lb 5 oz)   Height: 1.676 m (5' 6\")     Body mass index is 27.17 kg/m².     Physical Exam:  Gen: Well developed, well nourished in no acute distress.   Skin: Pink, warm, and dry  HEENT: conjunctiva non-injected, sclera non-icteric. EOMs intact.   Lungs: Effort is normal. Clear to auscultation bilaterally with good excursion.  CV: regular rate and rhythm without murmur  PSYCH: euthymic mood and reactive affect        Assessment and Plan:   The following treatment plan was discussed:   1. Moderate episode of recurrent major depressive disorder (HCC)  Patient reports her mental health is in a good place at this point.  She was just able to  the Wellbutrin about " 2 days ago and has not started it.  She still continues to suffer from low sex drive due to the sertraline and is hopeful that addition of Wellbutrin will help.  She has been exercising and trying to eat healthy and overall feels that she is doing well.  She will take the Wellbutrin as prescribed and report back in a few weeks.  I did  her on risks and benefits and side effects.        Followup: Return in about 4 weeks (around 8/31/2022).    Please note that this dictation was created using voice recognition software. I have worked with consultants from the vendor as well as technical experts from [facility] to optimize the interface. I have made every reasonable attempt to correct obvious errors, but I expect that there are errors of grammar and possibly content that I did not discover before finalizing the note.

## 2022-09-23 DIAGNOSIS — F33.1 MODERATE EPISODE OF RECURRENT MAJOR DEPRESSIVE DISORDER (HCC): ICD-10-CM

## 2022-09-23 RX ORDER — BUPROPION HYDROCHLORIDE 150 MG/1
TABLET, EXTENDED RELEASE ORAL
Qty: 60 TABLET | Refills: 4 | Status: ON HOLD | OUTPATIENT
Start: 2022-09-23 | End: 2023-07-11

## 2023-01-23 LAB
ABO GROUP BLD: NORMAL
BLD GP AB SCN SERPL QL: NEGATIVE
HBV SURFACE AG SERPL QL IA: NEGATIVE
HIV 1+2 AB+HIV1 P24 AG SERPL QL IA: NORMAL
RH BLD: NORMAL
RUBV IGG SERPL IA-ACNC: NORMAL
TREPONEMA PALLIDUM IGG+IGM AB [PRESENCE] IN SERUM OR PLASMA BY IMMUNOASSAY: NORMAL

## 2023-02-13 LAB
C TRACH DNA GENITAL QL NAA+PROBE: NEGATIVE
N GONORRHOEA DNA GENITAL QL NAA+PROBE: NEGATIVE

## 2023-05-16 LAB
GLUCOSE 1H P 50 G GLC PO SERPL-MCNC: 99 MG/DL
GLUCOSE 1H P 50 G GLC PO SERPL-MCNC: 99 MG/DL

## 2023-06-26 LAB — GP B STREP DNA SPEC QL NAA+PROBE: NEGATIVE

## 2023-07-09 ENCOUNTER — APPOINTMENT (OUTPATIENT)
Dept: OBGYN | Facility: MEDICAL CENTER | Age: 33
End: 2023-07-09
Attending: OBSTETRICS & GYNECOLOGY
Payer: MEDICAID

## 2023-07-09 ENCOUNTER — ANESTHESIA (OUTPATIENT)
Dept: ANESTHESIOLOGY | Facility: MEDICAL CENTER | Age: 33
End: 2023-07-09
Payer: MEDICAID

## 2023-07-09 ENCOUNTER — ANESTHESIA EVENT (OUTPATIENT)
Dept: ANESTHESIOLOGY | Facility: MEDICAL CENTER | Age: 33
End: 2023-07-09
Payer: MEDICAID

## 2023-07-09 ENCOUNTER — HOSPITAL ENCOUNTER (INPATIENT)
Facility: MEDICAL CENTER | Age: 33
LOS: 2 days | End: 2023-07-11
Attending: OBSTETRICS & GYNECOLOGY | Admitting: OBSTETRICS & GYNECOLOGY
Payer: MEDICAID

## 2023-07-09 DIAGNOSIS — R52 POSTPARTUM PAIN: Primary | ICD-10-CM

## 2023-07-09 PROBLEM — Z33.1 IUP (INTRAUTERINE PREGNANCY), INCIDENTAL: Status: ACTIVE | Noted: 2023-07-09

## 2023-07-09 LAB
ALBUMIN SERPL BCP-MCNC: 3.4 G/DL (ref 3.2–4.9)
ALBUMIN/GLOB SERPL: 1.1 G/DL
ALP SERPL-CCNC: 114 U/L (ref 30–99)
ALT SERPL-CCNC: 14 U/L (ref 2–50)
ANION GAP SERPL CALC-SCNC: 12 MMOL/L (ref 7–16)
AST SERPL-CCNC: 13 U/L (ref 12–45)
BASOPHILS # BLD AUTO: 0.4 % (ref 0–1.8)
BASOPHILS # BLD: 0.06 K/UL (ref 0–0.12)
BILIRUB SERPL-MCNC: 0.2 MG/DL (ref 0.1–1.5)
BUN SERPL-MCNC: 6 MG/DL (ref 8–22)
CALCIUM ALBUM COR SERPL-MCNC: 9.2 MG/DL (ref 8.5–10.5)
CALCIUM SERPL-MCNC: 8.7 MG/DL (ref 8.5–10.5)
CHLORIDE SERPL-SCNC: 105 MMOL/L (ref 96–112)
CO2 SERPL-SCNC: 22 MMOL/L (ref 20–33)
CREAT SERPL-MCNC: 0.5 MG/DL (ref 0.5–1.4)
EOSINOPHIL # BLD AUTO: 0.08 K/UL (ref 0–0.51)
EOSINOPHIL NFR BLD: 0.6 % (ref 0–6.9)
ERYTHROCYTE [DISTWIDTH] IN BLOOD BY AUTOMATED COUNT: 38.5 FL (ref 35.9–50)
GFR SERPLBLD CREATININE-BSD FMLA CKD-EPI: 128 ML/MIN/1.73 M 2
GLOBULIN SER CALC-MCNC: 3 G/DL (ref 1.9–3.5)
GLUCOSE SERPL-MCNC: 93 MG/DL (ref 65–99)
HCT VFR BLD AUTO: 36.5 % (ref 37–47)
HGB BLD-MCNC: 11.8 G/DL (ref 12–16)
HOLDING TUBE BB 8507: NORMAL
IMM GRANULOCYTES # BLD AUTO: 0.11 K/UL (ref 0–0.11)
IMM GRANULOCYTES NFR BLD AUTO: 0.8 % (ref 0–0.9)
LYMPHOCYTES # BLD AUTO: 2.61 K/UL (ref 1–4.8)
LYMPHOCYTES NFR BLD: 19.4 % (ref 22–41)
MCH RBC QN AUTO: 26.4 PG (ref 27–33)
MCHC RBC AUTO-ENTMCNC: 32.3 G/DL (ref 32.2–35.5)
MCV RBC AUTO: 81.7 FL (ref 81.4–97.8)
MONOCYTES # BLD AUTO: 0.53 K/UL (ref 0–0.85)
MONOCYTES NFR BLD AUTO: 3.9 % (ref 0–13.4)
NEUTROPHILS # BLD AUTO: 10.06 K/UL (ref 1.82–7.42)
NEUTROPHILS NFR BLD: 74.9 % (ref 44–72)
NRBC # BLD AUTO: 0 K/UL
NRBC BLD-RTO: 0 /100 WBC (ref 0–0.2)
PLATELET # BLD AUTO: 425 K/UL (ref 164–446)
PMV BLD AUTO: 9.7 FL (ref 9–12.9)
POTASSIUM SERPL-SCNC: 3.7 MMOL/L (ref 3.6–5.5)
PROT SERPL-MCNC: 6.4 G/DL (ref 6–8.2)
RBC # BLD AUTO: 4.47 M/UL (ref 4.2–5.4)
SODIUM SERPL-SCNC: 139 MMOL/L (ref 135–145)
T PALLIDUM AB SER QL IA: NORMAL
WBC # BLD AUTO: 13.5 K/UL (ref 4.8–10.8)

## 2023-07-09 PROCEDURE — 770002 HCHG ROOM/CARE - OB PRIVATE (112)

## 2023-07-09 PROCEDURE — 01967 NEURAXL LBR ANES VAG DLVR: CPT | Performed by: ANESTHESIOLOGY

## 2023-07-09 PROCEDURE — 700105 HCHG RX REV CODE 258: Mod: JZ | Performed by: ANESTHESIOLOGY

## 2023-07-09 PROCEDURE — 85025 COMPLETE CBC W/AUTO DIFF WBC: CPT

## 2023-07-09 PROCEDURE — 700111 HCHG RX REV CODE 636 W/ 250 OVERRIDE (IP): Mod: JZ

## 2023-07-09 PROCEDURE — 700105 HCHG RX REV CODE 258: Performed by: OBSTETRICS & GYNECOLOGY

## 2023-07-09 PROCEDURE — 36415 COLL VENOUS BLD VENIPUNCTURE: CPT

## 2023-07-09 PROCEDURE — 80053 COMPREHEN METABOLIC PANEL: CPT

## 2023-07-09 PROCEDURE — 303615 HCHG EPIDURAL/SPINAL ANESTHESIA FOR LABOR

## 2023-07-09 PROCEDURE — 700102 HCHG RX REV CODE 250 W/ 637 OVERRIDE(OP): Performed by: OBSTETRICS & GYNECOLOGY

## 2023-07-09 PROCEDURE — 86780 TREPONEMA PALLIDUM: CPT

## 2023-07-09 PROCEDURE — 700111 HCHG RX REV CODE 636 W/ 250 OVERRIDE (IP): Mod: JZ | Performed by: ANESTHESIOLOGY

## 2023-07-09 PROCEDURE — 700111 HCHG RX REV CODE 636 W/ 250 OVERRIDE (IP): Performed by: OBSTETRICS & GYNECOLOGY

## 2023-07-09 PROCEDURE — A9270 NON-COVERED ITEM OR SERVICE: HCPCS | Performed by: OBSTETRICS & GYNECOLOGY

## 2023-07-09 RX ORDER — OXYCODONE HYDROCHLORIDE 5 MG/1
5 TABLET ORAL
Status: DISCONTINUED | OUTPATIENT
Start: 2023-07-09 | End: 2023-07-10 | Stop reason: HOSPADM

## 2023-07-09 RX ORDER — TERBUTALINE SULFATE 1 MG/ML
0.25 INJECTION, SOLUTION SUBCUTANEOUS
Status: DISCONTINUED | OUTPATIENT
Start: 2023-07-09 | End: 2023-07-10 | Stop reason: HOSPADM

## 2023-07-09 RX ORDER — OXYTOCIN 10 [USP'U]/ML
10 INJECTION, SOLUTION INTRAMUSCULAR; INTRAVENOUS
Status: DISCONTINUED | OUTPATIENT
Start: 2023-07-09 | End: 2023-07-10 | Stop reason: HOSPADM

## 2023-07-09 RX ORDER — BUPIVACAINE HYDROCHLORIDE 2.5 MG/ML
INJECTION, SOLUTION EPIDURAL; INFILTRATION; INTRACAUDAL
Status: COMPLETED
Start: 2023-07-09 | End: 2023-07-09

## 2023-07-09 RX ORDER — IBUPROFEN 600 MG/1
600 TABLET ORAL
Status: COMPLETED | OUTPATIENT
Start: 2023-07-09 | End: 2023-07-10

## 2023-07-09 RX ORDER — SODIUM CHLORIDE, SODIUM LACTATE, POTASSIUM CHLORIDE, AND CALCIUM CHLORIDE .6; .31; .03; .02 G/100ML; G/100ML; G/100ML; G/100ML
1000 INJECTION, SOLUTION INTRAVENOUS
Status: COMPLETED | OUTPATIENT
Start: 2023-07-09 | End: 2023-07-09

## 2023-07-09 RX ORDER — SODIUM CHLORIDE, SODIUM LACTATE, POTASSIUM CHLORIDE, CALCIUM CHLORIDE 600; 310; 30; 20 MG/100ML; MG/100ML; MG/100ML; MG/100ML
1000 INJECTION, SOLUTION INTRAVENOUS CONTINUOUS
Status: ACTIVE | OUTPATIENT
Start: 2023-07-09 | End: 2023-07-09

## 2023-07-09 RX ORDER — LIDOCAINE HYDROCHLORIDE 10 MG/ML
20 INJECTION, SOLUTION INFILTRATION; PERINEURAL
Status: DISCONTINUED | OUTPATIENT
Start: 2023-07-09 | End: 2023-07-10 | Stop reason: HOSPADM

## 2023-07-09 RX ORDER — DEXTROSE, SODIUM CHLORIDE, SODIUM LACTATE, POTASSIUM CHLORIDE, AND CALCIUM CHLORIDE 5; .6; .31; .03; .02 G/100ML; G/100ML; G/100ML; G/100ML; G/100ML
INJECTION, SOLUTION INTRAVENOUS CONTINUOUS
Status: DISCONTINUED | OUTPATIENT
Start: 2023-07-09 | End: 2023-07-11 | Stop reason: HOSPADM

## 2023-07-09 RX ORDER — ACETAMINOPHEN 500 MG
1000 TABLET ORAL
Status: DISCONTINUED | OUTPATIENT
Start: 2023-07-09 | End: 2023-07-10 | Stop reason: HOSPADM

## 2023-07-09 RX ORDER — BUPIVACAINE HYDROCHLORIDE 2.5 MG/ML
INJECTION, SOLUTION EPIDURAL; INFILTRATION; INTRACAUDAL PRN
Status: DISCONTINUED | OUTPATIENT
Start: 2023-07-09 | End: 2023-07-10 | Stop reason: SURG

## 2023-07-09 RX ORDER — EPHEDRINE SULFATE 50 MG/ML
5 INJECTION, SOLUTION INTRAVENOUS
Status: DISCONTINUED | OUTPATIENT
Start: 2023-07-09 | End: 2023-07-10 | Stop reason: HOSPADM

## 2023-07-09 RX ORDER — ROPIVACAINE HYDROCHLORIDE 2 MG/ML
INJECTION, SOLUTION EPIDURAL; INFILTRATION; PERINEURAL
Status: COMPLETED
Start: 2023-07-09 | End: 2023-07-09

## 2023-07-09 RX ORDER — MISOPROSTOL 200 UG/1
1000 TABLET ORAL
Status: COMPLETED | OUTPATIENT
Start: 2023-07-09 | End: 2023-07-10

## 2023-07-09 RX ORDER — SODIUM CHLORIDE, SODIUM LACTATE, POTASSIUM CHLORIDE, AND CALCIUM CHLORIDE .6; .31; .03; .02 G/100ML; G/100ML; G/100ML; G/100ML
250 INJECTION, SOLUTION INTRAVENOUS PRN
Status: DISCONTINUED | OUTPATIENT
Start: 2023-07-09 | End: 2023-07-10 | Stop reason: HOSPADM

## 2023-07-09 RX ORDER — ROPIVACAINE HYDROCHLORIDE 2 MG/ML
INJECTION, SOLUTION EPIDURAL; INFILTRATION; PERINEURAL CONTINUOUS
Status: DISCONTINUED | OUTPATIENT
Start: 2023-07-10 | End: 2023-07-11 | Stop reason: HOSPADM

## 2023-07-09 RX ADMIN — OXYTOCIN 2 MILLI-UNITS/MIN: 10 INJECTION, SOLUTION INTRAMUSCULAR; INTRAVENOUS at 16:40

## 2023-07-09 RX ADMIN — ROPIVACAINE HYDROCHLORIDE 200 MG: 2 INJECTION, SOLUTION EPIDURAL; INFILTRATION; PERINEURAL at 23:23

## 2023-07-09 RX ADMIN — SODIUM CHLORIDE, POTASSIUM CHLORIDE, SODIUM LACTATE AND CALCIUM CHLORIDE 1000 ML: 600; 310; 30; 20 INJECTION, SOLUTION INTRAVENOUS at 22:13

## 2023-07-09 RX ADMIN — BUPIVACAINE HYDROCHLORIDE 3 ML: 2.5 INJECTION, SOLUTION EPIDURAL; INFILTRATION; INTRACAUDAL at 23:23

## 2023-07-09 RX ADMIN — SODIUM CHLORIDE, POTASSIUM CHLORIDE, SODIUM LACTATE AND CALCIUM CHLORIDE 1000 ML: 600; 310; 30; 20 INJECTION, SOLUTION INTRAVENOUS at 16:39

## 2023-07-09 RX ADMIN — FENTANYL CITRATE 50 MCG: 50 INJECTION, SOLUTION INTRAMUSCULAR; INTRAVENOUS at 23:28

## 2023-07-09 RX ADMIN — MISOPROSTOL 25 MCG: 100 TABLET ORAL at 12:01

## 2023-07-09 RX ADMIN — BUPIVACAINE HYDROCHLORIDE 3 ML: 2.5 INJECTION, SOLUTION EPIDURAL; INFILTRATION; INTRACAUDAL at 23:28

## 2023-07-09 RX ADMIN — ROPIVACAINE HYDROCHLORIDE 200 MG: 2 INJECTION, SOLUTION EPIDURAL; INFILTRATION at 23:23

## 2023-07-09 RX ADMIN — FENTANYL CITRATE 50 MCG: 50 INJECTION, SOLUTION INTRAMUSCULAR; INTRAVENOUS at 23:23

## 2023-07-09 ASSESSMENT — LIFESTYLE VARIABLES
TOTAL SCORE: 0
HOW MANY TIMES IN THE PAST YEAR HAVE YOU HAD 5 OR MORE DRINKS IN A DAY: 0
ALCOHOL_USE: NO
ON A TYPICAL DAY WHEN YOU DRINK ALCOHOL HOW MANY DRINKS DO YOU HAVE: 0
HAVE YOU EVER FELT YOU SHOULD CUT DOWN ON YOUR DRINKING: NO
TOTAL SCORE: 0
EVER HAD A DRINK FIRST THING IN THE MORNING TO STEADY YOUR NERVES TO GET RID OF A HANGOVER: NO
TOTAL SCORE: 0
EVER_SMOKED: NEVER
EVER FELT BAD OR GUILTY ABOUT YOUR DRINKING: NO
CONSUMPTION TOTAL: NEGATIVE
HAVE PEOPLE ANNOYED YOU BY CRITICIZING YOUR DRINKING: NO
AVERAGE NUMBER OF DAYS PER WEEK YOU HAVE A DRINK CONTAINING ALCOHOL: 0

## 2023-07-09 ASSESSMENT — PATIENT HEALTH QUESTIONNAIRE - PHQ9
2. FEELING DOWN, DEPRESSED, IRRITABLE, OR HOPELESS: NOT AT ALL
SUM OF ALL RESPONSES TO PHQ9 QUESTIONS 1 AND 2: 0
1. LITTLE INTEREST OR PLEASURE IN DOING THINGS: NOT AT ALL

## 2023-07-09 ASSESSMENT — FIBROSIS 4 INDEX: FIB4 SCORE: .3698001308168194147

## 2023-07-09 NOTE — PROGRESS NOTES
Bedside Report received from Nat and care assumed. Pt with no needs right now, states feeling occasional cramping but less than it was before. Plan of care discussed.   1610 - SVE 2/60/-2, unable to confirm head with sutures. MD Brown advised. Per MD US deferred since pt was vertex last US, orders to start pitocin received.   1752 - RN at bedside continuing to monitor EFM. Pt on birthing ball and difficult to keep baby on EFM continuously d/t positioning and placenta location. NO audible fetal decels noted while RN at bedside, FHT intermittently not registering which are showing as decels but confirmed with RN at bedside tracing maternal intermittently or not tracing, unable to determine prior to RN at bedside monitoring.    1807 - RN at bedside adjusting and holding monitor. Unable to keep FH tracing on with maternal HR tracing due to monitor feedback. RN audibly listening at bedside. No audible decels noted.   1900 - Report given to HUE Mcdaniels.

## 2023-07-09 NOTE — H&P
"ADMISSION DIAGNOSIS:     IUP at 37w0d.  GBS negative.  Gestational hypertension.  Unfavorable cervix.    Plan:     Admit to labor and delivery.  Admission and preeclampsia labs.  Cervical ripening with buccal cytotec and transition to IV pitocin.  AROM after epidural when cervix is favorable.  Ok for epidural.    Recent Labs     07/09/23  1033   WBC 13.5*   RBC 4.47   HEMOGLOBIN 11.8*   HEMATOCRIT 36.5*   MCV 81.7   MCH 26.4*   RDW 38.5   PLATELETCT 425   MPV 9.7   NEUTSPOLYS 74.90*   LYMPHOCYTES 19.40*   MONOCYTES 3.90   EOSINOPHILS 0.60   BASOPHILS 0.40     /89   Pulse (!) 119   Temp 36.3 °C (97.4 °F) (Tympanic)   Resp 16   Ht 1.664 m (5' 5.5\")   Wt 81.6 kg (180 lb)   SpO2 98%     A, A, and O x 3 NAD    SVE: per RN 1/40%/-3/Mid/moderate    TOCO: quiet    EFM: category I tracing.  Reactive and without decelerations.    Vertex    EFW  - 2900 grams    A/P: IUP at 37w0d.     Admit to L and D.   Buccal cytotec 25 mcg every 4 hours until cervix is more favorable and then IV pitocin.  Ok for epidural.  Preeclampsia labs pending.  AROM after epidural.  GBS negative.  "

## 2023-07-09 NOTE — CARE PLAN
Problem: Knowledge Deficit - L&D  Goal: Patient and family/caregivers will demonstrate understanding of plan of care, disease process/condition, diagnostic tests and medications  Outcome: Met   POC discussed  Problem: Pain  Goal: Patient's pain will be alleviated or reduced to the patient’s comfort goal  Outcome: Met   Patient not in pain at this time. Options presented will inform nurse if she needs any pain medication.

## 2023-07-09 NOTE — PROGRESS NOTES
0937  Patient presented to L&D for IOL for GHTN. Monitors placed. POC discussed. Orders received from Dr. Brown. Patient and spouse verbalized understanding of POC no questions or concerns at this time.  1200 Cytotec given  1520 Report given to HUE Bell

## 2023-07-10 LAB
ERYTHROCYTE [DISTWIDTH] IN BLOOD BY AUTOMATED COUNT: 38.6 FL (ref 35.9–50)
HCT VFR BLD AUTO: 33 % (ref 37–47)
HGB BLD-MCNC: 10.6 G/DL (ref 12–16)
MCH RBC QN AUTO: 26.4 PG (ref 27–33)
MCHC RBC AUTO-ENTMCNC: 32.1 G/DL (ref 32.2–35.5)
MCV RBC AUTO: 82.3 FL (ref 81.4–97.8)
PLATELET # BLD AUTO: 353 K/UL (ref 164–446)
PMV BLD AUTO: 9.9 FL (ref 9–12.9)
RBC # BLD AUTO: 4.01 M/UL (ref 4.2–5.4)
WBC # BLD AUTO: 16.8 K/UL (ref 4.8–10.8)

## 2023-07-10 PROCEDURE — 59409 OBSTETRICAL CARE: CPT

## 2023-07-10 PROCEDURE — 304965 HCHG RECOVERY SERVICES

## 2023-07-10 PROCEDURE — 770002 HCHG ROOM/CARE - OB PRIVATE (112)

## 2023-07-10 PROCEDURE — 36415 COLL VENOUS BLD VENIPUNCTURE: CPT

## 2023-07-10 PROCEDURE — 85027 COMPLETE CBC AUTOMATED: CPT

## 2023-07-10 PROCEDURE — 3E033VJ INTRODUCTION OF OTHER HORMONE INTO PERIPHERAL VEIN, PERCUTANEOUS APPROACH: ICD-10-PCS | Performed by: OBSTETRICS & GYNECOLOGY

## 2023-07-10 PROCEDURE — 700111 HCHG RX REV CODE 636 W/ 250 OVERRIDE (IP): Performed by: OBSTETRICS & GYNECOLOGY

## 2023-07-10 PROCEDURE — 700102 HCHG RX REV CODE 250 W/ 637 OVERRIDE(OP): Performed by: OBSTETRICS & GYNECOLOGY

## 2023-07-10 PROCEDURE — A9270 NON-COVERED ITEM OR SERVICE: HCPCS | Performed by: OBSTETRICS & GYNECOLOGY

## 2023-07-10 PROCEDURE — 700105 HCHG RX REV CODE 258: Performed by: OBSTETRICS & GYNECOLOGY

## 2023-07-10 RX ORDER — SIMETHICONE 125 MG
125 TABLET,CHEWABLE ORAL 4 TIMES DAILY PRN
Status: DISCONTINUED | OUTPATIENT
Start: 2023-07-10 | End: 2023-07-11 | Stop reason: HOSPADM

## 2023-07-10 RX ORDER — ACETAMINOPHEN 500 MG
1000 TABLET ORAL EVERY 6 HOURS PRN
Status: DISCONTINUED | OUTPATIENT
Start: 2023-07-10 | End: 2023-07-11 | Stop reason: HOSPADM

## 2023-07-10 RX ORDER — SODIUM CHLORIDE, SODIUM LACTATE, POTASSIUM CHLORIDE, CALCIUM CHLORIDE 600; 310; 30; 20 MG/100ML; MG/100ML; MG/100ML; MG/100ML
INJECTION, SOLUTION INTRAVENOUS PRN
Status: DISCONTINUED | OUTPATIENT
Start: 2023-07-10 | End: 2023-07-11 | Stop reason: HOSPADM

## 2023-07-10 RX ORDER — DOCUSATE SODIUM 100 MG/1
100 CAPSULE, LIQUID FILLED ORAL 2 TIMES DAILY PRN
Status: DISCONTINUED | OUTPATIENT
Start: 2023-07-10 | End: 2023-07-11 | Stop reason: HOSPADM

## 2023-07-10 RX ORDER — VITAMIN A ACETATE, BETA CAROTENE, ASCORBIC ACID, CHOLECALCIFEROL, .ALPHA.-TOCOPHEROL ACETATE, DL-, THIAMINE MONONITRATE, RIBOFLAVIN, NIACINAMIDE, PYRIDOXINE HYDROCHLORIDE, FOLIC ACID, CYANOCOBALAMIN, CALCIUM CARBONATE, FERROUS FUMARATE, ZINC OXIDE, CUPRIC OXIDE 3080; 12; 120; 400; 1; 1.84; 3; 20; 22; 920; 25; 200; 27; 10; 2 [IU]/1; UG/1; MG/1; [IU]/1; MG/1; MG/1; MG/1; MG/1; MG/1; [IU]/1; MG/1; MG/1; MG/1; MG/1; MG/1
1 TABLET, FILM COATED ORAL
Status: DISCONTINUED | OUTPATIENT
Start: 2023-07-10 | End: 2023-07-11 | Stop reason: HOSPADM

## 2023-07-10 RX ORDER — IBUPROFEN 800 MG/1
800 TABLET ORAL EVERY 8 HOURS PRN
Status: DISCONTINUED | OUTPATIENT
Start: 2023-07-10 | End: 2023-07-11 | Stop reason: HOSPADM

## 2023-07-10 RX ORDER — NIFEDIPINE 10 MG/1
10 CAPSULE ORAL
Status: DISCONTINUED | OUTPATIENT
Start: 2023-07-10 | End: 2023-07-11 | Stop reason: HOSPADM

## 2023-07-10 RX ORDER — OXYCODONE HYDROCHLORIDE 5 MG/1
5 TABLET ORAL EVERY 4 HOURS PRN
Status: DISCONTINUED | OUTPATIENT
Start: 2023-07-10 | End: 2023-07-11 | Stop reason: HOSPADM

## 2023-07-10 RX ORDER — MISOPROSTOL 200 UG/1
600 TABLET ORAL
Status: DISCONTINUED | OUTPATIENT
Start: 2023-07-10 | End: 2023-07-11 | Stop reason: HOSPADM

## 2023-07-10 RX ADMIN — PRENATAL WITH FERROUS FUM AND FOLIC ACID 1 TABLET: 3080; 920; 120; 400; 22; 1.84; 3; 20; 10; 1; 12; 200; 27; 25; 2 TABLET ORAL at 11:00

## 2023-07-10 RX ADMIN — OXYTOCIN 20 UNITS: 10 INJECTION, SOLUTION INTRAMUSCULAR; INTRAVENOUS at 04:30

## 2023-07-10 RX ADMIN — MISOPROSTOL 1000 MCG: 200 TABLET ORAL at 04:32

## 2023-07-10 RX ADMIN — ACETAMINOPHEN 1000 MG: 500 TABLET, FILM COATED ORAL at 18:40

## 2023-07-10 RX ADMIN — ACETAMINOPHEN 1000 MG: 500 TABLET, FILM COATED ORAL at 10:57

## 2023-07-10 RX ADMIN — IBUPROFEN 800 MG: 800 TABLET, FILM COATED ORAL at 21:45

## 2023-07-10 RX ADMIN — IBUPROFEN 800 MG: 800 TABLET, FILM COATED ORAL at 12:15

## 2023-07-10 RX ADMIN — OXYTOCIN 125 ML/HR: 10 INJECTION, SOLUTION INTRAMUSCULAR; INTRAVENOUS at 08:07

## 2023-07-10 RX ADMIN — SODIUM CHLORIDE, SODIUM LACTATE, POTASSIUM CHLORIDE, CALCIUM CHLORIDE AND DEXTROSE MONOHYDRATE: 5; 600; 310; 30; 20 INJECTION, SOLUTION INTRAVENOUS at 00:26

## 2023-07-10 RX ADMIN — IBUPROFEN 600 MG: 600 TABLET, FILM COATED ORAL at 06:12

## 2023-07-10 ASSESSMENT — EDINBURGH POSTNATAL DEPRESSION SCALE (EPDS)
I HAVE FELT SAD OR MISERABLE: NO, NOT AT ALL
I HAVE BLAMED MYSELF UNNECESSARILY WHEN THINGS WENT WRONG: NO, NEVER
I HAVE BEEN SO UNHAPPY THAT I HAVE BEEN CRYING: NO, NEVER
I HAVE BEEN SO UNHAPPY THAT I HAVE HAD DIFFICULTY SLEEPING: NOT AT ALL
I HAVE FELT SCARED OR PANICKY FOR NO GOOD REASON: NO, NOT AT ALL
I HAVE BEEN ABLE TO LAUGH AND SEE THE FUNNY SIDE OF THINGS: AS MUCH AS I ALWAYS COULD
I HAVE BEEN ANXIOUS OR WORRIED FOR NO GOOD REASON: YES, SOMETIMES
THE THOUGHT OF HARMING MYSELF HAS OCCURRED TO ME: NEVER
THINGS HAVE BEEN GETTING ON TOP OF ME: NO, MOST OF THE TIME I HAVE COPED QUITE WELL
I HAVE LOOKED FORWARD WITH ENJOYMENT TO THINGS: AS MUCH AS I EVER DID

## 2023-07-10 ASSESSMENT — PAIN SCALES - GENERAL: PAIN_LEVEL: 0

## 2023-07-10 ASSESSMENT — PAIN DESCRIPTION - PAIN TYPE: TYPE: ACUTE PAIN

## 2023-07-10 NOTE — H&P
DATE OF ADMISSION:  2023     ADMISSION HISTORY AND PHYSICAL     ADMISSION DIAGNOSES:   1.  A 37 weeks' gestation.  2.  Gestational hypertension.  3.  Group B Streptococcus negative.  4.  Unfavorable cervix.     HISTORY OF PRESENT ILLNESS:  The patient is a 33-year-old  2, para   1-0-0-1 at 37+0 weeks' gestation based upon an 11+4 week ultrasound performed   on 2022, who presents for scheduled induction of labor secondary to   gestational hypertension.  The patient has been followed by the [facility] for her history of preeclampsia, and it was recommended that   she be delivered by 37 weeks' gestation.  She is noted to be GBS negative.    She reports excellent fetal movement.  She denies vaginal bleeding, vaginal   discharge, leakage of fluid, and regular and painful uterine cramping and   contractions.  She denies all signs and symptoms of preeclampsia.  She was   seen most recently at the [facility] on 2023 for a   biophysical profile, and at that time, her TERRELL was 16.1, vertex presentation.    Prenatal care with Dr. Susan Brown, first visit on 2023.  Total visits   12.  Total weight gain 23 pounds.  Third trimester blood pressures   118-141/81-92     PRENATAL LABS:  GBS negative on 2023.  One-hour GCT 99.  [id number]   testing is negative for trisomies 21, 18, and 13, female fetus.  MSAFP for   open spina bifida risk is negative.  GC/chlamydia negative, negative.  Blood   type A positive, antibody screen negative, rubella immune, RPR nonreactive,   hepatitis B surface antigen negative.  Hep C viral antibody negative.  HIV   negative.  Preeclampsia labs have been within normal limits and were checked   at the [facility].     OBSTETRIC ULTRASOUND:   1.  On 2023 at 11+4 weeks' gestation, [facility] 2023.  2.  On 2023 at 15+4 weeks' gestation, [facility] 2023.  3.  On 03/15/2023 at 20+0 weeks' gestation, [facility] 2023.   Estimated fetal   weight 11 ounces.  18th percentile.  Vertex presentation.  Anterior placenta,   no previa.  Fetal heart rate 127 beats per minute.  Cervix 4.12 cm.  4.  On 2023 at 25 weeks 0 days.  One pound 11 ounces, 759 g. 22nd   percentile.  [facility] 2023.  Vertex presentation.  Anterior placenta, no   previa.  5.  On 2023 at [facility], estimated fetal weight 2   pounds 3 ounces, 988 g.  32nd percentile.  Estimated gestational age 27 weeks   1 day.  Female fetus breech presentation.  Normal fetal growth.  Anterior   placenta, no previa.  6.  On 2023 at 29 weeks 5 days.  Estimated fetal weight 3 pounds 1   ounce.  38th percentile for growth.  [facility] is 2023.  Vertex presentation.    Anterior placenta, no previa.  TERRELL 12.6 cm.  7.  On 2023 at 35 weeks 1 day.  Estimated fetal weight 2526 g.  Five   pounds 9 ounces.  35th percentile for growth.  TERRELL 14.3.  Impression:    calix intrauterine pregnancy at 35 weeks 2 days.  Vertex presentation.    Anterior placenta, no previa.  Normal fetal growth.     PAST OBSTETRIC HISTORY:   male infant on 2021 at 39 weeks 6 days.     PAST MEDICAL HISTORY:  Gestational hypertension.     PAST SURGICAL HISTORY:  None.     ALLERGIES:  No known drug allergies.     SOCIAL HISTORY:  She denies alcohol, tobacco, or drugs of abuse.     FAMILY HISTORY:  Noncontributory.     REVIEW OF SYSTEMS:  Negative.     PHYSICAL EXAMINATION:   VITAL SIGNS:   On admission, blood pressure 125/89, pulse 119, temperature   97.4, respiratory rate 16.  GENERAL:  Alert, awake, and oriented x3, in no acute distress.  ABDOMEN:  Gravid, vertex by Leopold's, estimated fetal weight 2900 g.  PELVIC:  Sterile vaginal exam on admission 1 cm, 40% effaced, -3 station, mid   position, moderate consistency.  [place] is quiet.  External fetal monitoring   category 1 tracing, reactive, without decels.     LABORATORY DATA:  On admission, white count 13.5, hemoglobin  11.8, hematocrit   36.5, platelets 425.  The remainder of her preeclampsia labs are pending.     ASSESSMENT:  A 33-year-old  2, para 1-0-0-1 at 37+0 weeks' gestation   based upon a first trimester ultrasound, who has gestational hypertension and   is Group B Streptococcus negative, and who presents for induction of labor   secondary to gestational hypertension.     PLAN:  The patient will be admitted to labor and delivery.  She will be given   buccal Cytotec for cervical ripening, then transitioned to IV Pitocin.  She   may have an epidural for labor analgesia and will anticipate a normal   spontaneous vaginal delivery. After the epidural has been placed, she will   undergo amniotomy and I will place an IUPC.        ______________________________  MD TAMIA Cabrera/GISEL/MAN    DD:  2023 20:07  DT:  2023 21:19    Job#:  [id number]

## 2023-07-10 NOTE — ANESTHESIA PREPROCEDURE EVALUATION
Date: 23  Procedure: Labor Epidural        calix pregnancy at 37 weeks gestation.    Relevant Problems   No relevant active problems       Physical Exam    Airway   Mallampati: II  TM distance: >3 FB  Neck ROM: full       Cardiovascular - normal exam  Rhythm: regular  Rate: normal  (-) murmur     Dental - normal exam           Pulmonary - normal exam  Breath sounds clear to auscultation     Abdominal    Neurological - normal exam                 Anesthesia Plan    ASA 2       Plan - epidural   Neuraxial block will be labor analgesia                  Pertinent diagnostic labs and testing reviewed    Informed Consent:    Anesthetic plan and risks discussed with patient.

## 2023-07-10 NOTE — PROGRESS NOTES
0815 Assumed care from labor and delivery. Oriented patient to room, call light, emergency light, TV, bed remote. Assessment completed, fundus firm, lochia light. Plan of care reviewed, verbalized understanding. Patient denies pain at this time, will call if pain med intervention needed.

## 2023-07-10 NOTE — LACTATION NOTE
This note was copied from a baby's chart.  Rhea Mom says she had plenty of milk with her first baby, but had difficulty latching due to flat nipples. She tried a nipple shield without much success, and eventually pumped/bottle fed for 4 months. She has a nipple shield, but wants to wait and see if she is able to latch with her new baby. Taught HE to aid with let down prior to latching, and also to feed expressed milk colostrum back to baby- if she is sleepy or not latching well. She does have colostrum from each breast. Attempted to latch baby on the left side in cross cradle hold, baby is willing and cooperative, but only does shallow latch.L-5. Mom expressed colostrum to baby while latched. Attempted to latch on the right side in football hold, baby is too sleepy, but maintains position and shallow latch at breast. Encouraged Mom to continue to do lots of STS and HE feeding colostrum with spoon today. If baby is cueing she should try to latch her. Taught to keep baby STS, and feed baby ad armando per baby feeding cues, a minimum of 10 times daily. Wake to feed baby if greater than 2  hours during the day, or 3-4 hours during the night since previous feeding. Mom has used a nipple shield in the past and does not want to go to the nipple shield right away, She will continue to work with baby. Discussed when her milk supply increases, and if she does go to the nipple shield, she should check her breasts post breastfeedings and pump q 3 hours to make sure they are empty and to help maintain her supply and avoid issues with mastitis. Provided written educational materials. Encoraged to call for additional LC assistance as needed.

## 2023-07-10 NOTE — CARE PLAN
The patient is Stable - Low risk of patient condition declining or worsening    Shift Goals  Clinical Goals: Maintain acceptable pain level  Patient Goals: healthy mom, healthy baby  Family Goals: support      Problem: Psychosocial - Postpartum  Goal: Patient will verbalize and demonstrate effective bonding and parenting behavior  Outcome: Progressing  Note: Patient providing infants cares. Breastfeeding and skin to skin done.      Problem: Altered Physiologic Condition  Goal: Patient physiologically stable as evidenced by normal lochia, palpable uterine involution and vitals within normal limits  Outcome: Progressing  Note: Patient fundus firm, lochia light. Self ambulating to restroom, jose care performed. Pitocin infusion completed.

## 2023-07-10 NOTE — PROGRESS NOTES
1900 - Report received from HUE Bell. , 37/0, admitted for IOL for GHTN. POC discussed.     2318 - Dr. De La Rosa at bedside for epidural placment. Test dose placed without complications at 2323.    2330 - Patient resting comfortably.    0008 - Dr. Brown at bedside for AROM. IUPC placed.    0009 - Interference noted on monitor, unable to trace FHT. IUPC cord replaced but issue was not resolved. Monitor replaced at 0018, issue resolved.     0024 - Patient resting comfortably.    0419 - Report given to Dr. Brown regarding SVE, provider called to bedside for delivery.     0426 - Dr. Brown at bedside for delivery.     0428 - Pushing initiated.     0429 - Delivery of viable infant female, 8/9 APGARs, nuchal cord x1, body cord x1.     0440 - Patient resting comfortably with infant skin to skin.     0700 - Report given to HUE Saavedra. POC discussed.

## 2023-07-10 NOTE — PROGRESS NOTES
"IUP at 37w0d.  GBS negative.  IOL for gestational hypertension - per [facility].    S:  Doing well.  Up on birthing ball.  Desires to wait for epidural a bit longer and then have AROM after epidural.    O:  /73   Pulse 88   Temp 36.4 °C (97.6 °F) (Temporal)   Resp 16   Ht 1.664 m (5' 5.5\")   Wt 81.6 kg (180 lb)   SpO2 98%     A, A, and O x 3 NAD    SVE: per RN at 1610 - 2/60%/-2    TOCO: every 2-5 minutes.    EFM: category I tracing.  Reactive and without decelerations.    A/P: 34 yo  at 37w0d.  IOL for gestational hypertension.  GBS negative.     Continue IV pitocin.   Ok for epidural.  AROM after epidural.  Place IUPC at that time.  Anticipate .  "

## 2023-07-10 NOTE — ANESTHESIA TIME REPORT
Anesthesia Start and Stop Event Times     Date Time Event    7/9/2023 2305 Ready for Procedure     2318 Anesthesia Start    7/10/2023 0429 Anesthesia Stop        Responsible Staff  07/09/23 to 07/10/23    Name Role Begin End    Sj De La Rosa M.D. Anesth 2311 0712        Overtime Reason:  no overtime (within assigned shift)    Comments:

## 2023-07-10 NOTE — PROGRESS NOTES
"IUP at 37w1d  Gestational hypertension.  GBS negative.  IOL.    S:  Doing well.  Comfortable with epidural.  Discussed the need to check her cervix and perform AROM with placement of an IUPC.  Questions answered.  She agrees with plan.    O:  /73   Pulse 88   Temp 36.4 °C (97.6 °F) (Temporal)   Resp 16   Ht 1.664 m (5' 5.5\")   Wt 81.6 kg (180 lb)   SpO2 98%     SVE: 3/60%/-2    AROM clear fluid at 0005.    IUPC placed.    TOCO: every 2-4 minutes on 10 mU/minute of IV pitocin.    EFM: category I tracing.  Reactive and without decelerations.     Latest Reference Range & Units 23 11:35   Sodium 135 - 145 mmol/L 139   Potassium 3.6 - 5.5 mmol/L 3.7   Chloride 96 - 112 mmol/L 105   Co2 20 - 33 mmol/L 22   Anion Gap 7.0 - 16.0  12.0   Glucose 65 - 99 mg/dL 93   Bun 8 - 22 mg/dL 6 (L)   Creatinine 0.50 - 1.40 mg/dL 0.50   GFR (CKD-EPI) >60 mL/min/1.73 m 2 128   Calcium 8.5 - 10.5 mg/dL 8.7   Correct Calcium 8.5 - 10.5 mg/dL 9.2   AST(SGOT) 12 - 45 U/L 13   ALT(SGPT) 2 - 50 U/L 14   Alkaline Phosphatase 30 - 99 U/L 114 (H)   Total Bilirubin 0.1 - 1.5 mg/dL 0.2   Albumin 3.2 - 4.9 g/dL 3.4   Total Protein 6.0 - 8.2 g/dL 6.4   Globulin 1.9 - 3.5 g/dL 3.0   A-G Ratio g/dL 1.1   (L): Data is abnormally low  (H): Data is abnormally high    A/P: IUP at 37w1d.  IOL for gestational hypertension. GBS negative.     AROM clear fluid at 0005.  IUPC placed.  Continue IV pitocin per protocol.  Anticipate .  Comfortable with epidural.  "

## 2023-07-10 NOTE — CARE PLAN
The patient is Stable - Low risk of patient condition declining or worsening    Shift Goals  Clinical Goals: cervical change, cateogry 1 fetal heart tracing  Patient Goals: healthy mom, healthy baby  Family Goals: support    Progress made toward(s) clinical / shift goals:    Problem: Risk for Infection and Impaired Wound Healing  Goal: Patient will remain free from infection  Outcome: Progressing     Problem: Risk for Injury  Goal: Patient and fetus will be free of preventable injury/complications  Outcome: Progressing

## 2023-07-10 NOTE — PROGRESS NOTES
0700 - Report received from Enedelia SWANN, care assumed.     0745 - Pt up to bathroom to void, transferred to PP via wheelchair with  in arms. Report given to Katelyn SWANN, care transferred.

## 2023-07-10 NOTE — ANESTHESIA POSTPROCEDURE EVALUATION
Patient: Brittany Parker    Procedure Summary     Date: 07/09/23 Room / Location:     Anesthesia Start: 2318 Anesthesia Stop: 07/10/23 0429    Procedure: Labor Epidural Diagnosis:     Scheduled Providers:  Responsible Provider: Sj De La Rosa M.D.    Anesthesia Type: epidural ASA Status: 2          Final Anesthesia Type: epidural  Last vitals  BP   Blood Pressure: 121/85    Temp   36.1 °C (97 °F)    Pulse   75   Resp   16    SpO2   98 %      Anesthesia Post Evaluation    Patient location during evaluation: floor  Patient participation: complete - patient participated  Level of consciousness: awake and alert  Pain score: 0    Airway patency: patent  Anesthetic complications: no  Cardiovascular status: hemodynamically stable  Respiratory status: acceptable  Hydration status: euvolemic    PONV: none          No notable events documented.     Nurse Pain Score: 0 (NPRS)

## 2023-07-10 NOTE — L&D DELIVERY NOTE
LABOR AND DELIVERY    DELIVERY SUMMARY    STAGE I LABOR:    The patient was admitted as a 34 yo  at 37w0d based upon a first trimester u/s who presented on 2023 for induction of labor secondary to gestational hypertension.  At the time of admission, her cervix was 1/40%/-3.  She was noted to be GBS negative.  She received one dose of buccal cytotec 25 mcg and was then started on IV pitocin per protocol. She received an epidural for labor analgesia and underwent AROM clear fluid at 0008 on 7/10/2023.  She progressed to completely dilated at 0419 on 7/10/2023.  Category I-II tracing with occasional variable decelerations.      STAGE II LABOR: 10 MINUTES    I was present for a  of a viable female infant at 0429.  The vertex of the infant delivered without difficulty.  A nuchal cord was palpated and the remainder of the infant delivered without difficulty and the nuchal cord slipped down to a body cord and then was reduced after delivery of the infant.  No shoulder dystocia was encountered.  The infant was placed on the mother's abdomen.  The infant's mouth and nose were bulb suctioned and after one minute of delayed cord clamping, the cord was clamped and cut.  A segment of cord was clamped and cut for a cord gas but this was held as the APGAR scores were 8 at one minute and 9 at five minutes.    The infant weight is pending.  The patient received IV pitocin after delivery of the infant.    STAGE III LABOR:  7 MINUTES    The placenta delivered intact with a 3-vessel cord  at 0436.    The patient's perineum was examined and found to be intact without lacerations.    Bimanual examination performed and the fundus was firm and 2 cm below the umbilicus and small clots were removed from the lower uterine segment.    The patient received 1000 mcg of cytotec per rectum after the above examination.    EBL - 200 cc

## 2023-07-10 NOTE — ANESTHESIA PROCEDURE NOTES
Epidural Block    Date/Time: 7/9/2023 11:18 PM    Performed by: Sj De La Rosa M.D.  Authorized by: Sj De La Rosa M.D.    Patient Location:  OB  Start Time:  7/9/2023 11:18 PM  End Time:  7/9/2023 11:23 PM  Reason for Block: labor analgesia    patient identified, IV checked, site marked, risks and benefits discussed, surgical consent, monitors and equipment checked and pre-op evaluation    Patient Position:  Sitting  Prep: ChloraPrep, patient draped and sterile technique    Monitoring:  Blood pressure, continuous pulse oximetry and heart rate  Approach:  Midline  Location:  L3-L4  Injection Technique:  BOOM saline  Skin infiltration:  Lidocaine  Strength:  1%  Dose:  3ml  Needle Type:  Tuohy  Needle Gauge:  17 G  Needle Length:  3.5 in  Loss of resistance::  4  Catheter Size:  19 G  Catheter at Skin Depth:  11  Test Dose Result:  Negative   Success on 1st pass  No heme/CSF  Catheter threaded easily  Negative aspiration  No evidence of complications  Patient comfortable after loading dose

## 2023-07-10 NOTE — PROGRESS NOTES
"PPD #0 s/p  without lacerations.    S:  Doing well.  Pain is well controlled.  She is up ambulating, voiding spontaneously, and tolerating a regular diet.  She is working on breast feeding.  She is concerned about the temperature of the baby.  She denies fevers/chills/night sweats/nausea/vomiting, RUQ pain, and vision changes.      O:  /80   Pulse 80   Temp 37 °C (98.6 °F) (Temporal)   Resp 17   Ht 1.664 m (5' 5.5\")   Wt 81.6 kg (180 lb)   SpO2 99%     A, A, and O x 3 NAD    FF u/1    No c/c/e    Recent Labs     23  1033   WBC 13.5*   RBC 4.47   HEMOGLOBIN 11.8*   HEMATOCRIT 36.5*   MCV 81.7   MCH 26.4*   RDW 38.5   PLATELETCT 425   MPV 9.7   NEUTSPOLYS 74.90*   LYMPHOCYTES 19.40*   MONOCYTES 3.90   EOSINOPHILS 0.60   BASOPHILS 0.40     A/P: PPD #0 s/p .     Ambulate TID.  ADAT.  CBC pending this morning.  Watch blood pressures.  Anticipate D/C tomorrow.  "

## 2023-07-11 VITALS
TEMPERATURE: 97.1 F | SYSTOLIC BLOOD PRESSURE: 127 MMHG | OXYGEN SATURATION: 96 % | BODY MASS INDEX: 28.93 KG/M2 | DIASTOLIC BLOOD PRESSURE: 79 MMHG | HEART RATE: 72 BPM | WEIGHT: 180 LBS | HEIGHT: 66 IN | RESPIRATION RATE: 18 BRPM

## 2023-07-11 PROBLEM — O13.9 GESTATIONAL HYPERTENSION: Status: ACTIVE | Noted: 2023-07-11

## 2023-07-11 PROCEDURE — A9270 NON-COVERED ITEM OR SERVICE: HCPCS | Performed by: OBSTETRICS & GYNECOLOGY

## 2023-07-11 PROCEDURE — 700102 HCHG RX REV CODE 250 W/ 637 OVERRIDE(OP): Performed by: OBSTETRICS & GYNECOLOGY

## 2023-07-11 RX ORDER — ACETAMINOPHEN 500 MG
500-1000 TABLET ORAL EVERY 6 HOURS PRN
Qty: 30 TABLET | Refills: 0 | COMMUNITY
Start: 2023-07-11

## 2023-07-11 RX ORDER — IBUPROFEN 200 MG
200-600 TABLET ORAL EVERY 6 HOURS PRN
COMMUNITY
Start: 2023-07-11

## 2023-07-11 RX ADMIN — PRENATAL WITH FERROUS FUM AND FOLIC ACID 1 TABLET: 3080; 920; 120; 400; 22; 1.84; 3; 20; 10; 1; 12; 200; 27; 25; 2 TABLET ORAL at 09:00

## 2023-07-11 NOTE — PROGRESS NOTES
Assessment complete, pt resting comfortably in bed at this time. Fundus firm, lochia light. Pt denies pain at this time. Pt states voiding without difficulty. POC discussed. Call light in reach of pt, encouraged to call for assistance.

## 2023-07-11 NOTE — LACTATION NOTE
Lactation follow-up visit:    MOB reported she pumped 5 ml of expressed breast milk.  RN stated MOB planned on providing infant with this milk at the next feed.  LC requested infant be provided with this expressed breast milk now due to sub-optimal latches since birth.  RN will also warm up 5 ml of donor breast milk (MOB's choice of formula supplementation) to equal the 10 ml of supplementation required per hospital supplementation guidelines to also be provided to infant at this time.  RN verbalized understanding and MOB was also in agreement.    MOB does not have WIC, but is interested in being screened for the program.  [facility] referral placed.    MOB informed of the outpatient lactation assistance available to her through [place] should she qualify for the program.    Three step feeding plan remains in place.  Please see this LC's previous chart note for description of this plan.    MOB verbalized understanding of all information provided to her and denied having any lactation questions at this time.  Encouraged MOB to call for lactation assistance as needed.

## 2023-07-11 NOTE — DISCHARGE INSTRUCTIONS

## 2023-07-11 NOTE — PROGRESS NOTES
POSTPARTUM DAY 1    No complaints.  Patient is doing well with infant care and lactation issues.  Patient is voiding well.    PE:    Afebrile  BP normal    Uterus involuting appropriately, nontender  Perineum:  No perineal complaints, so I didn't do specific perineal exam.  Calves nontender, Jesys negative bilaterally.     LAB:       07/09/23 10:33 07/10/23 20:53   WBC 13.5 (H) 16.8 (H)   Hemoglobin 11.8 (L) 10.6 (L)   Hematocrit 36.5 (L) 33.0 (L)   Platelet Count 425 353        PLAN:  Postpartum care.  Lactation consult.  Patient desires discharge:  today  .    Prescriptions:   PNV, OTC analgesics PRN .  Followup plans:   6 wks .

## 2023-07-11 NOTE — LACTATION NOTE
Lactation follow-up visit:    S: MOB reported infant is not latching onto the breast during feeding attempts.  She stated she has been spoon feeding baby expressed colostrum.    O: Infant's weight loss to date is within defined limits at 1.95% and she has voided twice and stooled once since delivery per infant's I&O log in Epic.    A/P: Observed MOB place baby to her right breast in the cross cradle position.  Assisted with positioning (nipple to nose), wedging of the breast, breast compressions, and how to achieve asymmetrical latch.  Infant did latch onto the breast for approximately 2 minutes with strong, nutritive suck, but she then grew fussy at the breast and fell asleep.  Breast compressions performed after latch did appear to entice baby to feed with stronger suck.  Baby continued to latch, but could not suckle greater than 1 burst of 4-6 suckles at a time before growing fussy and pulling away from the breast.    Discussed risks to milk supply and infant with introduction of nipple shield.  MOB informed she is recommended to pump and supplement feeds while using nipple shield.    Attempted latch with nipple shield that MOB brought with her to the hospital.  Infant did latch onto the breast, but fell asleep immediately afterwards.  MOB stated she occasionally felt light suckles while infant was sleeping at the breast.    Three step feeding plan initiated due to sub-optimal latch.  MOB stated she wants to pump first to see if she yields enough colostrum for a feed (approximately 10 ml per hospital supplementation guidelines) before providing baby with either donor breast milk or formula.  The three step feeding plan is as follows:  Put baby to the breast first at every feed  Supplement baby with donor breast milk and/or expressed breast milk per hospital supplementation guidelines  Pump, as instructed, to protect and grow milk supply    MOB verbalized understanding of all information provided to her and denied  having any further questions at this time. LC to initiate pumping.

## 2023-07-11 NOTE — DISCHARGE PLANNING
Discharge Planning Assessment Post Partum    Reason for Referral: History of depression  Address: [address] [place] [address]  Phone: [phone]  Type of Living Situation: living with FOB  Mom Diagnosis: Pregnancy  Baby Diagnosis: -37.1 weeks  Primary Language: English    Name of Baby: Annika (: 23)  Father of the Baby involved in baby’s care? Yes    Prenatal Care: Yes  Mom's PCP: Dr. Orellana  PCP for new baby: [facility]    Support System: FOB  Coping/Bonding between mother & baby: Yes  Source of Feeding: breast feeding  Supplies for Infant: prepared for infant; denies any needs    Mom's Insurance: [place] Medicaid  Baby Covered on Insurance:Yes  Mother Employed/School:   Other children in the home/names & ages: second baby    Financial Hardship/Income: No   Mom's Mental status: alert and oriented  Services used prior to admit: Medicaid     CPS History: No  Psychiatric History: history of depression.  MOB scored a 3 on the EPDS screen.  MOB stated she was on Sertraline after first baby and will contact her OB if she has any symptoms when she goes home  Domestic Violence History: No  Drug/ETOH History: No    Resources Provided: Offered resources, however MOB is well-prepared for infant and denies any needs  Referrals Made: None     Clearance for Discharge: Infant is cleared to discharge home with parents once medically cleared

## 2023-07-11 NOTE — CARE PLAN
Dx:  Tendinitis of left shoulder (M77.8)  Cervical radiculopathy (M54.12)  Myofascial pain syndrome, cervical (M79.18)        Authorized # of Visits:  10 [facility]         Next MD visit: 8/6/21 (Dr. Tomás Hardy)  Fall Risk: standard         Precautions: The patient is Stable - Low risk of patient condition declining or worsening    Shift Goals  Clinical Goals: pain control; rest  Patient Goals: healthy mom, healthy baby  Family Goals: support    Progress made toward(s) clinical / shift goals:  Pain controlled through PRN pain medication administrations. Pt slept with minimal interruptions.     Patient is not progressing towards the following goals:       Patient will be independent with HEP, it's progression, and self-management of their symptoms  2. Patient will demo cervical AROM min loss or better in all limited planes to be able to turn their head and look up without difficulty  3.  Patient will report

## 2023-07-11 NOTE — PROGRESS NOTES
1900: Received bedside report from day shift RN, Katelyn JAUREGUI Greeted pt and SO at the bedside. Whiteboard updated.     1950: Completed assessment. Pt denies of pain at this time. Will notify this RN when needing pain interventions. Pain medication was administered. IV in place, no signs of phlebitis or infiltration. POC discussed: pain control, lochia, hydration, and ambulation. Reinforced education on emergency and non-emergency call light system, and bed safety. Pt verbalized understanding. Call light placed within reach.

## 2023-07-12 NOTE — PROGRESS NOTES
Discharge instructions reviewed with Pt. Follow up appts and info reviewed. All questions and concerns answered and addressed.

## 2023-07-13 NOTE — DISCHARGE SUMMARY
Discharge Summary:      Brittany Pakrer    Admit Date:   2023  Discharge Date:  2023     Admitting diagnosis:  IUP (intrauterine pregnancy), incidental [Z33.1]  Discharge Diagnosis: Status post vaginal, spontaneous.  Pregnancy Complications: gestational hypertension  Tubal Ligation:  no        History:  Past Medical History:   Diagnosis Date    Patient denies medical problems      OB History    Para Term  AB Living   2 2 2     1   SAB IAB Ectopic Molar Multiple Live Births           0 2      # Outcome Date GA Lbr Ryan/2nd Weight Sex Delivery Anes PTL Lv   2 Term 07/10/23 37w1d / 00:10 2.825 kg (6 lb 3.7 oz) F Vag-Spont EPI N JUAN CARLOS   1 Term                 Patient has no known allergies.  Patient Active Problem List    Diagnosis Date Noted    Labor and delivery indication for care or intervention 2023    Gestational hypertension 2023    IUP (intrauterine pregnancy), incidental 2023    Moderate episode of recurrent major depressive disorder (HCC) 2022        Hospital Course:   33 y.o. , now para 2, was admitted with the above mentioned diagnosis, underwent Induction of Labor, vaginal, spontaneous. Patient postpartum course was unremarkable, with progressive advancement in diet , ambulation and toleration of oral analgesia. Patient without complaints today and desires discharge.      Vitals:    07/10/23 1100 07/10/23 1455 07/10/23 1801 23 0600   BP: 129/85 128/84 137/89 127/79   Pulse: 81 69 74 72   Resp: 17 17 17 18   Temp: 37.2 °C (98.9 °F) 36.8 °C (98.3 °F) 36.8 °C (98.3 °F) 36.2 °C (97.1 °F)   TempSrc: Temporal Temporal Temporal Temporal   SpO2: 99% 98% 99% 96%   Weight:       Height:           No current facility-administered medications for this encounter.     Current Outpatient Medications   Medication    acetaminophen (TYLENOL) 500 MG Tab    ibuprofen (MOTRIN) 200 MG Tab    Prenat w/o I-QY-Cyxywtr-FA-DHA (PNV-DHA PO)       Exam:  Breast Exam:  Inspection negative. No nipple discharge or bleeding. No masses or nodularity palpable  Abdomen: Abdomen soft, non-tender. BS normal. No masses,  No organomegaly  Fundus Non Tender: yes  Incision: none  Perineum: perineum intact  Extremity: extremities, peripheral pulses and reflexes normal     Labs:  Recent Labs     07/10/23  2053   WBC 16.8*   RBC 4.01*   HEMOGLOBIN 10.6*   HEMATOCRIT 33.0*   MCV 82.3   MCH 26.4*   MCHC 32.1*   RDW 38.6   PLATELETCT 353   MPV 9.9        Activity:   Discharge to home  Pelvic Rest x 6 weeks    Assessment:  normal postpartum course, no antihypertensives needed  Discharge Assessment: Voiding without difficulty     Follow up: Dr. Brown, 6 weeks.  Current Outpatient Medications   Medication Sig Dispense Refill    acetaminophen (TYLENOL) 500 MG Tab Take 1-2 Tablets by mouth every 6 hours as needed for Mild Pain or Moderate Pain. 30 Tablet 0    ibuprofen (MOTRIN) 200 MG Tab Take 1-3 Tablets by mouth every 6 hours as needed for Moderate Pain or Mild Pain.      Prenat w/o H-VE-Rhqeipt-FA-DHA (PNV-DHA PO) Take 1 Tablet by mouth every day. AM         Gen Whitten M.D.

## 2024-12-26 ENCOUNTER — OFFICE VISIT (OUTPATIENT)
Dept: URGENT CARE | Facility: CLINIC | Age: 34
End: 2024-12-26
Payer: COMMERCIAL

## 2024-12-26 VITALS
RESPIRATION RATE: 20 BRPM | SYSTOLIC BLOOD PRESSURE: 130 MMHG | WEIGHT: 164.4 LBS | BODY MASS INDEX: 26.42 KG/M2 | TEMPERATURE: 102.4 F | OXYGEN SATURATION: 92 % | HEART RATE: 116 BPM | HEIGHT: 66 IN | DIASTOLIC BLOOD PRESSURE: 80 MMHG

## 2024-12-26 DIAGNOSIS — J01.00 ACUTE NON-RECURRENT MAXILLARY SINUSITIS: ICD-10-CM

## 2024-12-26 DIAGNOSIS — R11.0 NAUSEA: ICD-10-CM

## 2024-12-26 DIAGNOSIS — Z20.828 EXPOSURE TO INFLUENZA: ICD-10-CM

## 2024-12-26 DIAGNOSIS — R50.9 FEVER, UNSPECIFIED FEVER CAUSE: ICD-10-CM

## 2024-12-26 DIAGNOSIS — M79.10 MYALGIA: ICD-10-CM

## 2024-12-26 DIAGNOSIS — J10.1 INFLUENZA A: ICD-10-CM

## 2024-12-26 DIAGNOSIS — R05.1 ACUTE COUGH: ICD-10-CM

## 2024-12-26 DIAGNOSIS — R00.0 TACHYCARDIA: ICD-10-CM

## 2024-12-26 LAB
FLUAV RNA SPEC QL NAA+PROBE: POSITIVE
FLUBV RNA SPEC QL NAA+PROBE: NEGATIVE
RSV RNA SPEC QL NAA+PROBE: NEGATIVE
SARS-COV-2 RNA RESP QL NAA+PROBE: NEGATIVE

## 2024-12-26 PROCEDURE — 99204 OFFICE O/P NEW MOD 45 MIN: CPT | Performed by: PHYSICIAN ASSISTANT

## 2024-12-26 PROCEDURE — 3075F SYST BP GE 130 - 139MM HG: CPT | Performed by: PHYSICIAN ASSISTANT

## 2024-12-26 PROCEDURE — 0241U POCT CEPHEID COV-2, FLU A/B, RSV - PCR: CPT | Performed by: PHYSICIAN ASSISTANT

## 2024-12-26 PROCEDURE — 3079F DIAST BP 80-89 MM HG: CPT | Performed by: PHYSICIAN ASSISTANT

## 2024-12-26 RX ORDER — OSELTAMIVIR PHOSPHATE 75 MG/1
75 CAPSULE ORAL 2 TIMES DAILY
Qty: 10 CAPSULE | Refills: 0 | Status: SHIPPED | OUTPATIENT
Start: 2024-12-26

## 2024-12-26 RX ORDER — NORETHINDRONE ACETATE AND ETHINYL ESTRADIOL 1MG-20(21)
KIT ORAL
COMMUNITY
Start: 2024-12-21

## 2024-12-26 RX ORDER — ONDANSETRON 4 MG/1
4 TABLET, ORALLY DISINTEGRATING ORAL EVERY 6 HOURS PRN
Qty: 15 TABLET | Refills: 0 | Status: SHIPPED | OUTPATIENT
Start: 2024-12-26

## 2024-12-26 RX ORDER — KETOROLAC TROMETHAMINE 15 MG/ML
15 INJECTION, SOLUTION INTRAMUSCULAR; INTRAVENOUS ONCE
Status: COMPLETED | OUTPATIENT
Start: 2024-12-26 | End: 2024-12-26

## 2024-12-26 RX ADMIN — KETOROLAC TROMETHAMINE 15 MG: 15 INJECTION, SOLUTION INTRAMUSCULAR; INTRAVENOUS at 16:10

## 2024-12-26 ASSESSMENT — VISUAL ACUITY: OU: 1

## 2024-12-26 ASSESSMENT — FIBROSIS 4 INDEX: FIB4 SCORE: 0.31

## 2024-12-26 NOTE — PROGRESS NOTES
"Subjective:     Verbal consent was acquired by the patient to use [employer] ambient listening note generation during this visit     Brittany Parker is a 34 y.o. female who presents for Flu Like Symptoms (X 3 weeks, body aches, cough, chest congestion.)       History of Present Illness  The patient presents for evaluation of sinus infection and influenza.    She has been experiencing significant sinus pressure, nasal congestion, purulent nasal discharge x 3 weeks.  Overnight she has developed severe body aches high fever nausea, cough.  Her daughter was recently hospitalized with influenza A earlier this week., . She has been experiencing fever and shaking episodes since yesterday. Additionally, she reports shortness of breath, difficulty keeping her eyes open due to pain, and severe body aches that limit her mobility. She has tried Tylenol and ibuprofen for symptom relief. She does not report any ear pain but mentions intermittent nausea. She has not received the influenza vaccine.    She has been experiencing sinus pressure and congestion, with occasional thick green nasal discharge, leading her to suspect a sinus infection. She has been self-medicating with leftover amoxicillin 500 mg from a previous dental procedure for the past week, but this has not resulted in any improvement. She also reports a loss of taste and intermittent nausea.                Medications:  acetaminophen Tabs  ibuprofen Tabs  PNV-DHA PO    Allergies:             Patient has no known allergies.    Past Social Hx:  Brittany Parker  reports that she has never smoked. She has never used smokeless tobacco. She reports that she does not currently use alcohol. She reports that she does not use drugs.           Problem list, medications, and allergies reviewed by myself today in Epic.     Objective:     /80   Pulse (!) 116   Temp (!) 39.1 °C (102.4 °F) (Oral)   Resp 20   Ht 1.676 m (5' 6\")   Wt 74.6 kg (164 lb 6.4 oz)   " SpO2 92%   Breastfeeding No   BMI 26.53 kg/m²     Physical Exam  Vitals and nursing note reviewed.   Constitutional:       General: She is not in acute distress.     Appearance: She is well-developed. She is ill-appearing. She is not toxic-appearing or diaphoretic.   HENT:      Head: Normocephalic. No right periorbital erythema or left periorbital erythema.      Right Ear: Ear canal and external ear normal. No mastoid tenderness. Tympanic membrane is injected. Tympanic membrane is not perforated or bulging.      Left Ear: Ear canal and external ear normal. No mastoid tenderness. Tympanic membrane is injected. Tympanic membrane is not perforated or bulging.      Nose: Mucosal edema and rhinorrhea present.      Mouth/Throat:      Mouth: Mucous membranes are dry.      Pharynx: Uvula midline. Posterior oropharyngeal erythema present. No uvula swelling.   Eyes:      General: Vision grossly intact. No allergic shiner.     Conjunctiva/sclera: Conjunctivae normal.      Pupils: Pupils are equal, round, and reactive to light.   Cardiovascular:      Rate and Rhythm: Normal rate and regular rhythm.      Pulses: Normal pulses.      Heart sounds: Normal heart sounds. No murmur heard.  Pulmonary:      Effort: Pulmonary effort is normal. No tachypnea, accessory muscle usage, prolonged expiration or respiratory distress.      Breath sounds: Normal breath sounds and air entry. No decreased air movement. No decreased breath sounds, wheezing, rhonchi or rales.      Comments: Lungs clear to auscultation bilaterally, no rhonchi rales or wheezes  Musculoskeletal:         General: Normal range of motion.      Cervical back: Normal range of motion and neck supple. No rigidity.   Lymphadenopathy:      Cervical: No cervical adenopathy.   Skin:     General: Skin is warm and dry.   Neurological:      Mental Status: She is alert and oriented to person, place, and time.   Psychiatric:         Behavior: Behavior is cooperative.           Results  for orders placed or performed in visit on 12/26/24   POCT CEPHEID COV-2, FLU A/B, RSV - PCR    Collection Time: 12/26/24  4:09 PM   Result Value Ref Range    SARS-CoV-2 by PCR Negative Negative, Invalid    Influenza virus A RNA Positive (A) Negative, Invalid    Influenza virus B, PCR Negative Negative, Invalid    RSV, PCR Negative Negative, Invalid           Assessment/Plan:     Diagnosis and Associated Orders:     1. Fever, unspecified fever cause  - POCT CEPHEID COV-2, FLU A/B, RSV - PCR  - oseltamivir (TAMIFLU) 75 MG Cap; Take 1 Capsule by mouth 2 times a day.  Dispense: 10 Capsule; Refill: 0    2. Tachycardia  - POCT CEPHEID COV-2, FLU A/B, RSV - PCR    3. Acute cough  - POCT CEPHEID COV-2, FLU A/B, RSV - PCR    4. Myalgia  - POCT CEPHEID COV-2, FLU A/B, RSV - PCR  - ketorolac (Toradol) 15 MG/ML injection 15 mg  - oseltamivir (TAMIFLU) 75 MG Cap; Take 1 Capsule by mouth 2 times a day.  Dispense: 10 Capsule; Refill: 0    5. Acute non-recurrent maxillary sinusitis  - amoxicillin-clavulanate (AUGMENTIN) 875-125 MG Tab; Take 1 Tablet by mouth 2 times a day for 7 days.  Dispense: 14 Tablet; Refill: 0    6. Exposure to influenza  - oseltamivir (TAMIFLU) 75 MG Cap; Take 1 Capsule by mouth 2 times a day.  Dispense: 10 Capsule; Refill: 0    7. Nausea  - ondansetron (ZOFRAN ODT) 4 MG TABLET DISPERSIBLE; Take 1 Tablet by mouth every 6 hours as needed for Nausea/Vomiting for up to 15 doses.  Dispense: 15 Tablet; Refill: 0    8. Influenza A    Other orders  - BLISOVI FE 1/20 1-20 MG-MCG per tablet; TAKE 1 TABLET BY MOUTH EVERY DAY FOR 84 DAYS        Medical Decision Making:    Pleasant 34 y.o. presents to clinic with:    Assessment & Plan  1. Influenza A.  Tachycardia and fever manifested as systemic illness.  Symptoms, including high fever, elevated heart rate, body aches, and headache with exposure from her daughter.  Will start Tamiflu as she has been exposed to her daughter who had influenza.  Toradol injection will be  administered today to alleviate her body aches and headache. She is advised to maintain adequate hydration and rest. Over-the-counter medications such as Tylenol up to 1000 mg every 8 hours can be used for symptom management. However, she should avoid ibuprofen today due to the administration of Toradol but can resume it tomorrow.    2. Sinus Infection.  She reports sinus pressure, congestion, and  thick green nasal discharge that has been ongoing for over 3 weeks.  Based on duration of symptoms I suspect this is a bacterial sinus infection.  I did send a prescription for Augmentin to her pharmacy.. She is advised to use nasal saline spray and Flonase to help with sinus symptoms.     I personally reviewed prior external notes and test results pertinent to today's visit. Patient is clinically stable at today's urgent care visit.  Patient appears nontoxic with no acute distress noted. Appropriate for outpatient care at this time.  Return to clinic or go to ED if symptoms worsen or persist.  Red flag symptoms discussed.  Patient/Parent/Guardian voices understanding.   All side effects of medication discussed including allergic response, GI upset, tendon injury, rash, sedation etc    Please note that this dictation was created using voice recognition software. I have made a reasonable attempt to correct obvious errors, but I expect that there are errors of grammar and possibly content that I did not discover before finalizing the note.    This note was electronically signed by Marie Farmer PA-C

## 2025-05-05 ENCOUNTER — APPOINTMENT (OUTPATIENT)
Dept: ADMISSIONS | Facility: MEDICAL CENTER | Age: 35
End: 2025-05-05
Attending: OBSTETRICS & GYNECOLOGY
Payer: COMMERCIAL

## 2025-05-19 ENCOUNTER — PRE-ADMISSION TESTING (OUTPATIENT)
Dept: ADMISSIONS | Facility: MEDICAL CENTER | Age: 35
End: 2025-05-19
Attending: OBSTETRICS & GYNECOLOGY
Payer: COMMERCIAL

## 2025-05-19 NOTE — PREADMIT AVS NOTE
Current Medications   Medication Instructions    Multiple Vitamin (MULTIVITAMIN PO) Stop 7 days before surgery    BLISOVI FE 1/20 1-20 MG-MCG per tablet Hold medication day of procedure

## 2025-05-21 ENCOUNTER — APPOINTMENT (OUTPATIENT)
Dept: ADMISSIONS | Facility: MEDICAL CENTER | Age: 35
End: 2025-05-21
Attending: OBSTETRICS & GYNECOLOGY
Payer: COMMERCIAL

## 2025-05-21 DIAGNOSIS — Z01.812 PRE-OPERATIVE LABORATORY EXAMINATION: ICD-10-CM

## 2025-05-21 DIAGNOSIS — Z01.810 PRE-OPERATIVE CARDIOVASCULAR EXAMINATION: Primary | ICD-10-CM

## 2025-05-21 DIAGNOSIS — Z01.810 PRE-OPERATIVE CARDIOVASCULAR EXAMINATION: ICD-10-CM

## 2025-05-21 LAB
ANION GAP SERPL CALC-SCNC: 11 MMOL/L (ref 7–16)
ANISOCYTOSIS BLD QL SMEAR: ABNORMAL
BASOPHILS # BLD AUTO: 0 % (ref 0–1.8)
BASOPHILS # BLD: 0 K/UL (ref 0–0.12)
BUN SERPL-MCNC: 10 MG/DL (ref 8–22)
CALCIUM SERPL-MCNC: 9.4 MG/DL (ref 8.5–10.5)
CHLORIDE SERPL-SCNC: 104 MMOL/L (ref 96–112)
CO2 SERPL-SCNC: 24 MMOL/L (ref 20–33)
COMMENT NL1176: NORMAL
CREAT SERPL-MCNC: 0.73 MG/DL (ref 0.5–1.4)
EKG IMPRESSION: NORMAL
EOSINOPHIL # BLD AUTO: 0.22 K/UL (ref 0–0.51)
EOSINOPHIL NFR BLD: 1.7 % (ref 0–6.9)
ERYTHROCYTE [DISTWIDTH] IN BLOOD BY AUTOMATED COUNT: 40.8 FL (ref 35.9–50)
GFR SERPLBLD CREATININE-BSD FMLA CKD-EPI: 111 ML/MIN/1.73 M 2
GLUCOSE SERPL-MCNC: 77 MG/DL (ref 65–99)
HCG SERPL QL: NEGATIVE
HCT VFR BLD AUTO: 41.9 % (ref 37–47)
HGB BLD-MCNC: 13.9 G/DL (ref 12–16)
LYMPHOCYTES # BLD AUTO: 4.17 K/UL (ref 1–4.8)
LYMPHOCYTES NFR BLD: 31.6 % (ref 22–41)
MANUAL DIFF BLD: NORMAL
MCH RBC QN AUTO: 29.4 PG (ref 27–33)
MCHC RBC AUTO-ENTMCNC: 33.2 G/DL (ref 32.2–35.5)
MCV RBC AUTO: 88.6 FL (ref 81.4–97.8)
MICROCYTES BLD QL SMEAR: ABNORMAL
MONOCYTES # BLD AUTO: 0.5 K/UL (ref 0–0.85)
MONOCYTES NFR BLD AUTO: 3.5 % (ref 0–13.4)
MORPHOLOGY BLD-IMP: NORMAL
NEUTROPHILS # BLD AUTO: 8.34 K/UL (ref 1.82–7.42)
NEUTROPHILS NFR BLD: 63.2 % (ref 44–72)
NRBC # BLD AUTO: 0 K/UL
NRBC BLD-RTO: 0 /100 WBC (ref 0–0.2)
PLATELET # BLD AUTO: 378 K/UL (ref 164–446)
PLATELET BLD QL SMEAR: NORMAL
PMV BLD AUTO: 9.7 FL (ref 9–12.9)
POTASSIUM SERPL-SCNC: 4.1 MMOL/L (ref 3.6–5.5)
RBC # BLD AUTO: 4.73 M/UL (ref 4.2–5.4)
RBC BLD AUTO: PRESENT
SODIUM SERPL-SCNC: 139 MMOL/L (ref 135–145)
VARIANT LYMPHS BLD QL SMEAR: NORMAL
WBC # BLD AUTO: 13.2 K/UL (ref 4.8–10.8)

## 2025-05-21 PROCEDURE — 85007 BL SMEAR W/DIFF WBC COUNT: CPT

## 2025-05-21 PROCEDURE — 36415 COLL VENOUS BLD VENIPUNCTURE: CPT

## 2025-05-21 PROCEDURE — 80048 BASIC METABOLIC PNL TOTAL CA: CPT

## 2025-05-21 PROCEDURE — 93010 ELECTROCARDIOGRAM REPORT: CPT | Performed by: INTERNAL MEDICINE

## 2025-05-21 PROCEDURE — 85027 COMPLETE CBC AUTOMATED: CPT

## 2025-05-21 PROCEDURE — 93005 ELECTROCARDIOGRAM TRACING: CPT | Mod: TC

## 2025-05-21 PROCEDURE — 84703 CHORIONIC GONADOTROPIN ASSAY: CPT

## 2025-06-02 NOTE — H&P
DATE OF ADMISSION:  05/21/2025     PREOPERATIVE HISTORY AND PHYSICAL     PREOPERATIVE DIAGNOSES:  1.  Dysmenorrhea.  2.  Dyspareunia.  3.  Dysuria.  4.  Abnormal uterine bleeding.  5.  Thickened endometrium.  6.  Desires conservative surgical management.     PLANNED PROCEDURE:  Pelvic examination under anesthesia, pelviscopy, lysis of   adhesions, excision/fulguration of endometriosis implants, cystoscopy with   bladder distention, diagnostic and operative hysteroscopy, excision of   endometrial polyp/submucosal fibroid/endometrial curettage with the   MyoSure/oscillating cutting blade, and any other medically necessary   procedures.     HISTORY OF PRESENT ILLNESS:  The patient is a 35-year-old para 2-0-0-2,   sexually active woman with a last menstrual period of 05/11/2025, who has a   long history of dysmenorrhea, dyspareunia, and dysuria and he desires   conservative surgical management.  The patient underwent a pelvic ultrasound   on 04/16/2025, which demonstrated that her uterus measured 6.7 x 3.5 x 5.1 cm.    Her endometrial stripe measured 0.3 cm.  The uterus was anteverted without   obvious masses or lesions.  Her uterus appeared heterogeneous, likely   consistent with adenomyosis.  The right ovary measured 2.6 x 1.7 x 2.3 cm.    The left ovary measured 3.4 x 1.6 x 2.6 cm.  There were no concerning adnexal   masses or free pelvic fluid noted.  The left ovary had multiple simple   peripheral-appearing follicular cysts.  The patient's previous ultrasounds   have demonstrated a thickened endometrium.  The risks, benefits, and   alternatives of the above procedure were discussed with the patient and she   agrees to proceed.     PAST MEDICAL HISTORY:  None.     PAST SURGICAL HISTORY:  None.     PAST OBSTETRIC HISTORY:  NSVDs x2.  She does have a history of gestational   hypertension in her most recent pregnancy and preeclampsia in her first   pregnancy.     PAST GYNECOLOGIC HISTORY:  She denies STDs, PID,  abnormal Paps, or HSV.     FAMILY HISTORY:  Noncontributory.     REVIEW OF SYSTEMS:  Negative.     ALLERGIES:  No known drug allergies.     MEDICATIONS:  Oral contraceptive pills.     SOCIAL HISTORY:  She denies alcohol, tobacco, or drug abuse.     PHYSICAL EXAMINATION:  VITAL SIGNS:  Blood pressure 135/98, pulse 76, and temperature 98.2.  GENERAL:  Alert, awake, and oriented x3, in no acute distress.  LUNGS:  Clear to auscultation bilaterally.  HEART:  Regular rate and rhythm.  No murmurs, rubs or gallops.  S1 and S2   intact.  EXTREMITIES:  No clubbing, cyanosis or edema.  GENITOURINARY:  Examination is otherwise deferred.     PREOPERATIVE LABORATORY STUDIES:  Comprehensive metabolic panel:  Sodium 139,   potassium 4.1, chloride 104, CO2 of 24, glucose 77, BUN 10, creatinine 0.3,   calcium 9.4.  Qualitative serum hCG is negative.  CBC with diff and platelets:   White count 13.2, hemoglobin 13.9, hematocrit 41.9, platelets 378.     Preoperative EKG demonstrates sinus rhythm, borderline short UT interval.     ASSESSMENT AND PLAN:  A 35-year-old para 2-0-0-2, sexually active woman with   the last menstrual period of 05/11/2025, who has a history of dysmenorrhea,   dyspareunia, and dysuria and he desires conservative surgical management.  We   will proceed to the operating room for a pelvic examination under anesthesia,   pelviscopy, lysis of adhesions, excision/fulguration of endometriosis   implants, cystoscopy with bladder distention, diagnostic and operative   hysteroscopy, excision of endometrial polyp/submucosal fibroid/endometrial   curettage with the MyoSure/oscillating cutting blade, and any other medically   necessary procedures.  Risks, benefits, and alternatives were discussed with   the patient and she agrees to proceed.        ______________________________  MD TAMIA Cabrera/PEDRO LUIS    DD:  06/02/2025 14:19  DT:  06/02/2025 15:16    Job#:  [id number]

## 2025-06-03 ENCOUNTER — ANESTHESIA EVENT (OUTPATIENT)
Dept: SURGERY | Facility: MEDICAL CENTER | Age: 35
End: 2025-06-03
Payer: COMMERCIAL

## 2025-06-03 ENCOUNTER — HOSPITAL ENCOUNTER (OUTPATIENT)
Facility: MEDICAL CENTER | Age: 35
End: 2025-06-03
Attending: OBSTETRICS & GYNECOLOGY | Admitting: OBSTETRICS & GYNECOLOGY
Payer: COMMERCIAL

## 2025-06-03 ENCOUNTER — ANESTHESIA (OUTPATIENT)
Dept: SURGERY | Facility: MEDICAL CENTER | Age: 35
End: 2025-06-03
Payer: COMMERCIAL

## 2025-06-03 VITALS
RESPIRATION RATE: 16 BRPM | SYSTOLIC BLOOD PRESSURE: 128 MMHG | DIASTOLIC BLOOD PRESSURE: 77 MMHG | WEIGHT: 159.83 LBS | TEMPERATURE: 97 F | OXYGEN SATURATION: 99 % | BODY MASS INDEX: 25.09 KG/M2 | HEIGHT: 67 IN | HEART RATE: 73 BPM

## 2025-06-03 LAB
HCG UR QL: NEGATIVE
PATHOLOGY CONSULT NOTE: NORMAL

## 2025-06-03 PROCEDURE — 700102 HCHG RX REV CODE 250 W/ 637 OVERRIDE(OP): Performed by: ANESTHESIOLOGY

## 2025-06-03 PROCEDURE — 160046 HCHG PACU - 1ST 60 MINS PHASE II: Performed by: OBSTETRICS & GYNECOLOGY

## 2025-06-03 PROCEDURE — 160025 RECOVERY II MINUTES (STATS): Performed by: OBSTETRICS & GYNECOLOGY

## 2025-06-03 PROCEDURE — 160035 HCHG PACU - 1ST 60 MINS PHASE I: Performed by: OBSTETRICS & GYNECOLOGY

## 2025-06-03 PROCEDURE — 110371 HCHG SHELL REV 272: Performed by: OBSTETRICS & GYNECOLOGY

## 2025-06-03 PROCEDURE — 700101 HCHG RX REV CODE 250: Performed by: ANESTHESIOLOGY

## 2025-06-03 PROCEDURE — 700111 HCHG RX REV CODE 636 W/ 250 OVERRIDE (IP): Mod: JZ | Performed by: ANESTHESIOLOGY

## 2025-06-03 PROCEDURE — 160015 HCHG STAT PREOP MINUTES: Performed by: OBSTETRICS & GYNECOLOGY

## 2025-06-03 PROCEDURE — 160009 HCHG ANES TIME/MIN: Performed by: OBSTETRICS & GYNECOLOGY

## 2025-06-03 PROCEDURE — 160002 HCHG RECOVERY MINUTES (STAT): Performed by: OBSTETRICS & GYNECOLOGY

## 2025-06-03 PROCEDURE — 81025 URINE PREGNANCY TEST: CPT

## 2025-06-03 PROCEDURE — 160029 HCHG SURGERY MINUTES - 1ST 30 MINS LEVEL 4: Performed by: OBSTETRICS & GYNECOLOGY

## 2025-06-03 PROCEDURE — 160041 HCHG SURGERY MINUTES - EA ADDL 1 MIN LEVEL 4: Performed by: OBSTETRICS & GYNECOLOGY

## 2025-06-03 PROCEDURE — 88305 TISSUE EXAM BY PATHOLOGIST: CPT | Performed by: PATHOLOGY

## 2025-06-03 PROCEDURE — 160036 HCHG PACU - EA ADDL 30 MINS PHASE I: Performed by: OBSTETRICS & GYNECOLOGY

## 2025-06-03 PROCEDURE — A9270 NON-COVERED ITEM OR SERVICE: HCPCS | Performed by: ANESTHESIOLOGY

## 2025-06-03 PROCEDURE — 700101 HCHG RX REV CODE 250: Performed by: OBSTETRICS & GYNECOLOGY

## 2025-06-03 PROCEDURE — 700105 HCHG RX REV CODE 258: Performed by: ANESTHESIOLOGY

## 2025-06-03 PROCEDURE — 88305 TISSUE EXAM BY PATHOLOGIST: CPT | Mod: 26 | Performed by: PATHOLOGY

## 2025-06-03 PROCEDURE — 160048 HCHG OR STATISTICAL LEVEL 1-5: Performed by: OBSTETRICS & GYNECOLOGY

## 2025-06-03 RX ORDER — MEPERIDINE HYDROCHLORIDE 25 MG/ML
25 INJECTION INTRAMUSCULAR; INTRAVENOUS; SUBCUTANEOUS
Status: DISCONTINUED | OUTPATIENT
Start: 2025-06-03 | End: 2025-06-03 | Stop reason: HOSPADM

## 2025-06-03 RX ORDER — OXYCODONE HCL 5 MG/5 ML
10 SOLUTION, ORAL ORAL
Status: COMPLETED | OUTPATIENT
Start: 2025-06-03 | End: 2025-06-03

## 2025-06-03 RX ORDER — BUPIVACAINE HYDROCHLORIDE AND EPINEPHRINE 2.5; 5 MG/ML; UG/ML
INJECTION, SOLUTION EPIDURAL; INFILTRATION; INTRACAUDAL; PERINEURAL
Status: DISCONTINUED
Start: 2025-06-03 | End: 2025-06-03 | Stop reason: HOSPADM

## 2025-06-03 RX ORDER — ONDANSETRON 2 MG/ML
4 INJECTION INTRAMUSCULAR; INTRAVENOUS
Status: COMPLETED | OUTPATIENT
Start: 2025-06-03 | End: 2025-06-03

## 2025-06-03 RX ORDER — IPRATROPIUM BROMIDE AND ALBUTEROL SULFATE 2.5; .5 MG/3ML; MG/3ML
3 SOLUTION RESPIRATORY (INHALATION)
Status: DISCONTINUED | OUTPATIENT
Start: 2025-06-03 | End: 2025-06-03 | Stop reason: HOSPADM

## 2025-06-03 RX ORDER — ONDANSETRON 2 MG/ML
INJECTION INTRAMUSCULAR; INTRAVENOUS PRN
Status: DISCONTINUED | OUTPATIENT
Start: 2025-06-03 | End: 2025-06-03 | Stop reason: SURG

## 2025-06-03 RX ORDER — SODIUM CHLORIDE, SODIUM LACTATE, POTASSIUM CHLORIDE, CALCIUM CHLORIDE 600; 310; 30; 20 MG/100ML; MG/100ML; MG/100ML; MG/100ML
INJECTION, SOLUTION INTRAVENOUS
Status: DISCONTINUED | OUTPATIENT
Start: 2025-06-03 | End: 2025-06-03 | Stop reason: SURG

## 2025-06-03 RX ORDER — DEXAMETHASONE SODIUM PHOSPHATE 4 MG/ML
INJECTION, SOLUTION INTRA-ARTICULAR; INTRALESIONAL; INTRAMUSCULAR; INTRAVENOUS; SOFT TISSUE PRN
Status: DISCONTINUED | OUTPATIENT
Start: 2025-06-03 | End: 2025-06-03 | Stop reason: SURG

## 2025-06-03 RX ORDER — HYDROMORPHONE HYDROCHLORIDE 1 MG/ML
0.2 INJECTION, SOLUTION INTRAMUSCULAR; INTRAVENOUS; SUBCUTANEOUS
Status: DISCONTINUED | OUTPATIENT
Start: 2025-06-03 | End: 2025-06-03 | Stop reason: HOSPADM

## 2025-06-03 RX ORDER — HYDROMORPHONE HYDROCHLORIDE 1 MG/ML
0.1 INJECTION, SOLUTION INTRAMUSCULAR; INTRAVENOUS; SUBCUTANEOUS
Status: DISCONTINUED | OUTPATIENT
Start: 2025-06-03 | End: 2025-06-03 | Stop reason: HOSPADM

## 2025-06-03 RX ORDER — EPHEDRINE SULFATE 50 MG/ML
INJECTION, SOLUTION INTRAVENOUS PRN
Status: DISCONTINUED | OUTPATIENT
Start: 2025-06-03 | End: 2025-06-03 | Stop reason: SURG

## 2025-06-03 RX ORDER — BUPIVACAINE HYDROCHLORIDE AND EPINEPHRINE 2.5; 5 MG/ML; UG/ML
INJECTION, SOLUTION EPIDURAL; INFILTRATION; INTRACAUDAL; PERINEURAL
Status: DISCONTINUED | OUTPATIENT
Start: 2025-06-03 | End: 2025-06-03 | Stop reason: HOSPADM

## 2025-06-03 RX ORDER — SODIUM CHLORIDE, SODIUM LACTATE, POTASSIUM CHLORIDE, CALCIUM CHLORIDE 600; 310; 30; 20 MG/100ML; MG/100ML; MG/100ML; MG/100ML
INJECTION, SOLUTION INTRAVENOUS CONTINUOUS
Status: DISCONTINUED | OUTPATIENT
Start: 2025-06-03 | End: 2025-06-03 | Stop reason: HOSPADM

## 2025-06-03 RX ORDER — DIPHENHYDRAMINE HYDROCHLORIDE 50 MG/ML
12.5 INJECTION, SOLUTION INTRAMUSCULAR; INTRAVENOUS
Status: DISCONTINUED | OUTPATIENT
Start: 2025-06-03 | End: 2025-06-03 | Stop reason: HOSPADM

## 2025-06-03 RX ORDER — KETOROLAC TROMETHAMINE 15 MG/ML
INJECTION, SOLUTION INTRAMUSCULAR; INTRAVENOUS PRN
Status: DISCONTINUED | OUTPATIENT
Start: 2025-06-03 | End: 2025-06-03 | Stop reason: SURG

## 2025-06-03 RX ORDER — OXYCODONE HCL 5 MG/5 ML
5 SOLUTION, ORAL ORAL
Status: COMPLETED | OUTPATIENT
Start: 2025-06-03 | End: 2025-06-03

## 2025-06-03 RX ORDER — MIDAZOLAM HYDROCHLORIDE 1 MG/ML
1 INJECTION INTRAMUSCULAR; INTRAVENOUS
Status: DISCONTINUED | OUTPATIENT
Start: 2025-06-03 | End: 2025-06-03 | Stop reason: HOSPADM

## 2025-06-03 RX ORDER — LIDOCAINE HYDROCHLORIDE 20 MG/ML
INJECTION, SOLUTION EPIDURAL; INFILTRATION; INTRACAUDAL; PERINEURAL PRN
Status: DISCONTINUED | OUTPATIENT
Start: 2025-06-03 | End: 2025-06-03 | Stop reason: SURG

## 2025-06-03 RX ORDER — MIDAZOLAM HYDROCHLORIDE 1 MG/ML
INJECTION INTRAMUSCULAR; INTRAVENOUS PRN
Status: DISCONTINUED | OUTPATIENT
Start: 2025-06-03 | End: 2025-06-03 | Stop reason: SURG

## 2025-06-03 RX ORDER — HYDROMORPHONE HYDROCHLORIDE 1 MG/ML
0.5 INJECTION, SOLUTION INTRAMUSCULAR; INTRAVENOUS; SUBCUTANEOUS
Status: DISCONTINUED | OUTPATIENT
Start: 2025-06-03 | End: 2025-06-03 | Stop reason: HOSPADM

## 2025-06-03 RX ORDER — CEFAZOLIN SODIUM 1 G/3ML
INJECTION, POWDER, FOR SOLUTION INTRAMUSCULAR; INTRAVENOUS PRN
Status: DISCONTINUED | OUTPATIENT
Start: 2025-06-03 | End: 2025-06-03 | Stop reason: SURG

## 2025-06-03 RX ADMIN — HYDROMORPHONE HYDROCHLORIDE 0.2 MG: 1 INJECTION, SOLUTION INTRAMUSCULAR; INTRAVENOUS; SUBCUTANEOUS at 15:35

## 2025-06-03 RX ADMIN — LIDOCAINE HYDROCHLORIDE 40 MG: 20 INJECTION, SOLUTION EPIDURAL; INFILTRATION; INTRACAUDAL; PERINEURAL at 12:49

## 2025-06-03 RX ADMIN — SODIUM CHLORIDE, POTASSIUM CHLORIDE, SODIUM LACTATE AND CALCIUM CHLORIDE: 600; 310; 30; 20 INJECTION, SOLUTION INTRAVENOUS at 12:44

## 2025-06-03 RX ADMIN — FENTANYL CITRATE 50 MCG: 50 INJECTION, SOLUTION INTRAMUSCULAR; INTRAVENOUS at 14:34

## 2025-06-03 RX ADMIN — FENTANYL CITRATE 100 MCG: 50 INJECTION, SOLUTION INTRAMUSCULAR; INTRAVENOUS at 13:46

## 2025-06-03 RX ADMIN — CEFAZOLIN 2 G: 1 INJECTION, POWDER, FOR SOLUTION INTRAMUSCULAR; INTRAVENOUS at 12:44

## 2025-06-03 RX ADMIN — PROPOFOL 200 MG: 10 INJECTION, EMULSION INTRAVENOUS at 12:49

## 2025-06-03 RX ADMIN — FENTANYL CITRATE 100 MCG: 50 INJECTION, SOLUTION INTRAMUSCULAR; INTRAVENOUS at 12:49

## 2025-06-03 RX ADMIN — ONDANSETRON 4 MG: 2 INJECTION INTRAMUSCULAR; INTRAVENOUS at 16:04

## 2025-06-03 RX ADMIN — EPHEDRINE SULFATE 20 MG: 50 INJECTION, SOLUTION INTRAVENOUS at 13:58

## 2025-06-03 RX ADMIN — HYDROMORPHONE HYDROCHLORIDE 0.2 MG: 1 INJECTION, SOLUTION INTRAMUSCULAR; INTRAVENOUS; SUBCUTANEOUS at 15:17

## 2025-06-03 RX ADMIN — MIDAZOLAM HYDROCHLORIDE 2 MG: 1 INJECTION, SOLUTION INTRAMUSCULAR; INTRAVENOUS at 12:44

## 2025-06-03 RX ADMIN — HYDROMORPHONE HYDROCHLORIDE 0.2 MG: 1 INJECTION, SOLUTION INTRAMUSCULAR; INTRAVENOUS; SUBCUTANEOUS at 15:48

## 2025-06-03 RX ADMIN — FENTANYL CITRATE 50 MCG: 50 INJECTION, SOLUTION INTRAMUSCULAR; INTRAVENOUS at 14:54

## 2025-06-03 RX ADMIN — SUGAMMADEX 200 MG: 100 INJECTION, SOLUTION INTRAVENOUS at 14:29

## 2025-06-03 RX ADMIN — DEXAMETHASONE SODIUM PHOSPHATE 8 MG: 4 INJECTION INTRA-ARTICULAR; INTRALESIONAL; INTRAMUSCULAR; INTRAVENOUS; SOFT TISSUE at 13:00

## 2025-06-03 RX ADMIN — ONDANSETRON 4 MG: 2 INJECTION INTRAMUSCULAR; INTRAVENOUS at 13:00

## 2025-06-03 RX ADMIN — FENTANYL CITRATE 50 MCG: 50 INJECTION, SOLUTION INTRAMUSCULAR; INTRAVENOUS at 14:43

## 2025-06-03 RX ADMIN — FENTANYL CITRATE 50 MCG: 50 INJECTION, SOLUTION INTRAMUSCULAR; INTRAVENOUS at 14:12

## 2025-06-03 RX ADMIN — ROCURONIUM BROMIDE 50 MG: 10 INJECTION INTRAVENOUS at 12:49

## 2025-06-03 RX ADMIN — HYDROMORPHONE HYDROCHLORIDE 0.2 MG: 1 INJECTION, SOLUTION INTRAMUSCULAR; INTRAVENOUS; SUBCUTANEOUS at 15:06

## 2025-06-03 RX ADMIN — KETOROLAC TROMETHAMINE 30 MG: 15 INJECTION, SOLUTION INTRAMUSCULAR; INTRAVENOUS at 13:00

## 2025-06-03 RX ADMIN — OXYCODONE HYDROCHLORIDE 10 MG: 5 SOLUTION ORAL at 14:43

## 2025-06-03 ASSESSMENT — PAIN DESCRIPTION - PAIN TYPE
TYPE: SURGICAL PAIN

## 2025-06-03 ASSESSMENT — PAIN SCALES - GENERAL: PAIN_LEVEL: 5

## 2025-06-03 ASSESSMENT — FIBROSIS 4 INDEX: FIB4 SCORE: 0.3

## 2025-06-03 NOTE — ANESTHESIA POSTPROCEDURE EVALUATION
Patient: Brittany Parker    Procedure Summary       Date: 06/03/25 Room / Location: [facility] ROOM 27 / SURGERY SAME DAY [facility]    Anesthesia Start: 1244 Anesthesia Stop: 1440    Procedures:       PELVIC EXAM UNDER ANESTHESIA, PELVISCOPY, LYSIS OF ADHESIONS, EXCISION/FULGURATION OF ENDOMETRIOSIS, CYSTOSCOPY WITH BLADDER DISTENTION, DIAGNOSTIC AND OPERATIVE HYSTEROSCOPY, EXCISION OF POLY, ENDOMETRIAL CURETTAGE WITH OSCILLATING CUTTING BLADE LEFT OVARIAN CYSTECTOMY (Abdomen)      CYSTOSCOPY (Bladder)      HYSTEROSCOPY, WITH TISSUE REMOVAL, USING HYSTEROSCOPIC ROTATING CUTTER BLADE (Uterus) Diagnosis: (ABNORMAL UTERINE BLEEDING, DYSMENORRHEA, PELVIC PAIN AND ADENOMYOSIS)    Surgeons: Susan Garrido M.D. Responsible Provider: Shaan Puga M.D.    Anesthesia Type: general ASA Status: 1            Final Anesthesia Type: general  Last vitals  BP   Blood Pressure: 132/73    Temp   36.1 °C (97 °F)    Pulse   (!) 109   Resp   19    SpO2   93 %      Anesthesia Post Evaluation    Patient location during evaluation: PACU  Patient participation: complete - patient participated  Level of consciousness: awake and alert  Pain score: 5    Airway patency: patent  Anesthetic complications: no  Cardiovascular status: hemodynamically stable  Respiratory status: acceptable  Hydration status: euvolemic    PONV: none          No notable events documented.     Nurse Pain Score: 6 (NPRS)

## 2025-06-03 NOTE — OR SURGEON
Immediate Post OP Note    PreOp Diagnosis:     1.  Dysmenorrhea.  2.  Dyspareunia.  3.  Dysuria.  4.  Abnormal uterine bleeding.  5.  Thickened endometrium.  6.  Desires conservative surgical management.      PostOp Diagnosis: as above  Pelvic adhesive disease.  Endometriosis.  Left ovarian cyst.  Interstitial cystitis.  Thickened endometrium - likely endometrial polyp - pathology pending.      Procedure(s):  PELVIC EXAM UNDER ANESTHESIA, PELVISCOPY, LYSIS OF ADHESIONS, EXCISION/FULGURATION OF ENDOMETRIOSIS, LEFT OVARIAN CYSTECTOMY, PLACEMENT OF SURGICEL, CYSTOSCOPY WITH BLADDER DISTENTION, DIAGNOSTIC AND OPERATIVE HYSTEROSCOPY, EXCISION OF POLYP//ENDOMETRIAL CURETTAGE WITH OSCILLATING CUTTING BLADE AND ANY OTHER MEDICALLY NECESSARY PROCEDURES - Wound Class: Clean Contaminated  CYSTOSCOPY  HYSTEROSCOPY, WITH TISSUE REMOVAL, USING HYSTEROSCOPIC ROTATING CUTTER BLADE    Surgeon(s):  Susan Garrido M.D.    Anesthesiologist/Type of Anesthesia:  No anesthesia staff entered./General and local    Surgical Staff:  Circulator: Amira Chadwick R.N.  Relief Scrub: Liana Stahl  Scrub Person: Jose Francisco Cuevas    Specimens removed if any:   Left ovarian cyst tissue.  2. Endometrial polyps and endometrial curettings.    Estimated Blood Loss: 10 cc    Findings: EUA - uterus is AV mobile and not enlarged.  No obvious adnexal masses.  Cervix is without masses.    Laparoscopic findings: bilateral pelvic and abdominal adhesive disease.  Normal upper abdomen and appendix.  Normal uterus.  Normal bilateral fallopian tubes.  Left ovary with a 5 cm serous fluid filled ovarian cyst.  Normal right ovary but with a small pinpoint endometriosis implant.  Bilateral ureters are visualized.  Pelvic peritoneum is erythematous likely consistent with inflammation.      Cystoscopic findings: bladder distended with 400 cc of saline and erythema and dilated vasculature noted consistent with interstitial cystitis.    Hysteroscopic findings:  plush endometrium noted consistent with endometrial polyps.  No obvious concerning masses visualized.  Bilateral tubal ostia were not well visualized secondary to plush endometrium.  MyosureLite procedure performed.  90 cc deficit.  Cervix is hemostatic.    Complications: none apparent.        6/3/2025 12:36 PM Susan Garrido M.D.

## 2025-06-03 NOTE — ANESTHESIA PROCEDURE NOTES
Airway    Date/Time: 6/3/2025 12:50 PM    Performed by: Shaan Puga M.D.  Authorized by: Shaan Puga M.D.    Location:  OR  Urgency:  Elective  Indications for Airway Management:  Anesthesia      Spontaneous Ventilation: absent    Sedation Level:  Deep  Preoxygenated: Yes    Patient Position:  Sniffing  Final Airway Type:  Endotracheal airway  Final Endotracheal Airway:  ETT  Cuffed: Yes    Technique Used for Successful ETT Placement:  Direct laryngoscopy    Insertion Site:  Oral  Blade Type:  Jaclyn  Laryngoscope Blade/Videolaryngoscope Blade Size:  3  ETT Size (mm):  7.0  Measured from:  Teeth  ETT to Teeth (cm):  21  Placement Verified by: auscultation and capnometry    Cormack-Lehane Classification:  Grade IIa - partial view of glottis  Number of Attempts at Approach:  1

## 2025-06-03 NOTE — OR NURSING
Orders:    Hemoglobin A1C; Future     1435 Pt arrived from OR to PACU bay 9 ., report received. Connected to monitor, VSS. On no oxygen.  Dressings x2 abdomen CDI. Peripad with no drainage.     1443 Pt tolerating clear liquids. Patient medicated per MAR for pain.     1454 subsequent dose of medication given per MAR for pain.     1506 patient medicated per MAR for pain.     1517 subsequent dose of medication given per MAR.     1530: Discharge instructions reviewed with patient and family member. All questions answered, verbalizes understanding.     1535 patient medicated per MAR    1548 subsequent dose of medication given per MAR    1557: Pt assisted into clothing. Pt up to bathroom to void, pt able to void.     1604 patient medicated per MAR for nausea. Pt sipping on juice, with cool air.     1625: IV and ID bands removed. Pt then escorted to car via wheelchair, accompanied by RN. All personal belongings & discharge instructions with patient/family.

## 2025-06-03 NOTE — DISCHARGE INSTRUCTIONS
Call for a temp >100.4, heavy vaginal bleeding, foul smelling discharge, or if your incision oozes blood, pus, or fluid.    No driving x 2 weeks or while on narcotics, no lifting >20 pounds x 4 weeks, and pelvic rest x 6 weeks.   Ambulate three times a day.    Call for signs/symptoms of DVT/PE: shortness of breath, trouble breathing, leg swelling or redness.     If any questions arise, call your provider.  If your provider is not available, please feel free to call the Surgical Center at [phone]. A nurse from the surgery center will call you tomorrow to see how you are doing after you procedure.     MEDICATIONS: Resume taking daily medication.  Take prescribed pain medication with food.  If no medication is prescribed, you may take non-aspirin pain medication if needed.  PAIN MEDICATION CAN BE VERY CONSTIPATING.  Take a stool softener or laxative such as senokot, pericolace, or milk of magnesia if needed.    Last pain medication given at:    Toradol (same class as Ibuprofen) at 1:00 PM. The next time you may take Ibuprofen is after 9:00 PM.   Oxycodone at 2:45 PM. Please follow directions on your pain medication of when you can take it next.   You may take Tylenol at any time.     What to Expect Post Anesthesia    Rest and take it easy for the first 24 hours.  A responsible adult is recommended to remain with you during that time.  It is normal to feel sleepy.  We encourage you to not do anything that requires balance, judgment or coordination.    FOR 24 HOURS DO NOT:  Drive, operate machinery or run household appliances.  Drink beer or alcoholic beverages.  Make important decisions or sign legal documents.    To avoid nausea, slowly advance diet as tolerated, avoiding spicy or greasy foods for the first day.  Add more substantial food to your diet according to your provider's instructions.  Babies can be fed formula or breast milk as soon as they are hungry.  INCREASE FLUIDS AND FIBER TO AVOID  CONSTIPATION.    MILD FLU-LIKE SYMPTOMS ARE NORMAL.  YOU MAY EXPERIENCE GENERALIZED MUSCLE ACHES, THROAT IRRITATION, HEADACHE AND/OR SOME NAUSEA.

## 2025-06-03 NOTE — H&P
DATE OF ADMISSION:  06/03/2025     PREOPERATIVE HISTORY AND PHYSICAL     PREOPERATIVE DIAGNOSES:  1.  Dysmenorrhea.  2.  Dyspareunia.  3.  Dysuria.  4.  Abnormal uterine bleeding.  5.  Thickened endometrium.  6.  Desires conservative surgical management.     PLANNED PROCEDURE:  Pelvic examination under anesthesia, pelviscopy, lysis of   adhesions, excision/fulguration of endometriosis implants, cystoscopy with   bladder distention, diagnostic and operative hysteroscopy, excision of   endometrial polyp/submucosal fibroid/endometrial curettage with the   MyoSure/oscillating cutting blade, and any other medically necessary   procedures.     HISTORY OF PRESENT ILLNESS:  The patient is a 35-year-old para 2-0-0-2,   sexually active woman with a last menstrual period of 05/11/2025, who has a   long history of dysmenorrhea, dyspareunia, and dysuria and he desires   conservative surgical management.  The patient underwent a pelvic ultrasound   on 04/16/2025, which demonstrated that her uterus measured 6.7 x 3.5 x 5.1 cm.    Her endometrial stripe measured 0.3 cm.  The uterus was anteverted without   obvious masses or lesions.  Her uterus appeared heterogeneous, likely   consistent with adenomyosis.  The right ovary measured 2.6 x 1.7 x 2.3 cm.    The left ovary measured 3.4 x 1.6 x 2.6 cm.  There were no concerning adnexal   masses or free pelvic fluid noted.  The left ovary had multiple simple   peripheral-appearing follicular cysts.  The patient's previous ultrasounds   have demonstrated a thickened endometrium.  The risks, benefits, and   alternatives of the above procedure were discussed with the patient and she   agrees to proceed.     PAST MEDICAL HISTORY:  None.     PAST SURGICAL HISTORY:  None.     PAST OBSTETRIC HISTORY:  NSVDs x2.  She does have a history of gestational   hypertension in her most recent pregnancy and preeclampsia in her first   pregnancy.     PAST GYNECOLOGIC HISTORY:  She denies STDs, PID,  abnormal Paps, or HSV.     FAMILY HISTORY:  Noncontributory.     REVIEW OF SYSTEMS:  Negative.     ALLERGIES:  No known drug allergies.     MEDICATIONS:  Oral contraceptive pills.     SOCIAL HISTORY:  She denies alcohol, tobacco, or drug abuse.     PHYSICAL EXAMINATION:  VITAL SIGNS:  Blood pressure 135/98, pulse 76, and temperature 98.2.  GENERAL:  Alert, awake, and oriented x3, in no acute distress.  LUNGS:  Clear to auscultation bilaterally.  HEART:  Regular rate and rhythm.  No murmurs, rubs or gallops.  S1 and S2   intact.  EXTREMITIES:  No clubbing, cyanosis or edema.  GENITOURINARY:  Examination is otherwise deferred.     PREOPERATIVE LABORATORY STUDIES:  Comprehensive metabolic panel:  Sodium 139,   potassium 4.1, chloride 104, CO2 of 24, glucose 77, BUN 10, creatinine 0.3,   calcium 9.4.  Qualitative serum hCG is negative.  CBC with diff and platelets:   White count 13.2, hemoglobin 13.9, hematocrit 41.9, platelets 378.     Preoperative EKG demonstrates sinus rhythm, borderline short MS interval.     ASSESSMENT AND PLAN:  A 35-year-old para 2-0-0-2, sexually active woman with   the last menstrual period of 05/11/2025, who has a history of dysmenorrhea,   dyspareunia, and dysuria and he desires conservative surgical management.  We   will proceed to the operating room for a pelvic examination under anesthesia,   pelviscopy, lysis of adhesions, excision/fulguration of endometriosis   implants, cystoscopy with bladder distention, diagnostic and operative   hysteroscopy, excision of endometrial polyp/submucosal fibroid/endometrial   curettage with the MyoSure/oscillating cutting blade, and any other medically   necessary procedures.  Risks, benefits, and alternatives were discussed with   the patient and she agrees to proceed.        ______________________________  MD TAMIA Cabrera/PEDRO LUIS    DD:  06/02/2025 14:19  DT:  06/02/2025 15:16    Job#:  [id number]

## 2025-06-03 NOTE — ANESTHESIA PREPROCEDURE EVALUATION
Case: [id number] Date/Time: 06/03/25 1215    Procedures:       PELVIC EXAM UNDER ANESTHESIA, PELVISCIOY, LYSIS OF ADHESIONS, EXCISION/FULGURATION OF ENDOMETRIOSIS, CYSTOSCOPY WITH BLADDER DISTENTION, DIAGNOSTIC AND OPERATIVE HYSTEROSCOPY, EXCISION OF POLYP/FIBROID/ENDOMETRIAL CURETTAGE WITH OSCILLATING CUTTING BLADE AND ANY OTHER MEDICALLY NECESSARY PROCEDURES      CYSTOSCOPY      HYSTEROSCOPY, WITH TISSUE REMOVAL, USING HYSTEROSCOPIC ROTATING CUTTER BLADE    Pre-op diagnosis: ABNORMAL UTERINE BLEEDING, DYSMENORRHEA, PELVIC PAIN AND ADENOMYOSIS    Location: [facility] ROOM 27 / SURGERY SAME DAY [facility]    Surgeons: Susan Garrido M.D.            Relevant Problems   CARDIAC   (positive) Gestational hypertension      OB   (positive) Gestational hypertension       Physical Exam    Airway   Mallampati: II  TM distance: >3 FB  Neck ROM: full       Cardiovascular - normal exam  Rhythm: regular  Rate: normal    (-) murmur     Dental - normal exam           Pulmonary - normal examBreath sounds clear to auscultation     Abdominal    Neurological - normal exam                   Anesthesia Plan    ASA 1       Plan - general       Airway plan will be ETT          Induction: intravenous    Postoperative Plan: Postoperative administration of opioids is intended.    Pertinent diagnostic labs and testing reviewed    Informed Consent:    Anesthetic plan and risks discussed with patient.    Use of blood products discussed with: patient whom consented to blood products.

## 2025-06-03 NOTE — ANESTHESIA TIME REPORT
Anesthesia Start and Stop Event Times       Date Time Event    6/3/2025 1213 Ready for Procedure     1244 Anesthesia Start     1440 Anesthesia Stop          Responsible Staff  06/03/25      Name Role Begin End    Shaan Puga M.D. Anesth 1244 1440          Overtime Reason:  no overtime (within assigned shift)    Comments:

## 2025-06-04 NOTE — OP REPORT
DATE OF SERVICE:  06/03/2025     PREOPERATIVE DIAGNOSES:  1.  Dysmenorrhea.  2.  Dyspareunia.  3.  Dysuria.  4.  Abnormal uterine bleeding.  5.  Thickened endometrium.  6.  Desires conservative surgical management.     POSTOPERATIVE DIAGNOSES:  1.  Dysmenorrhea.  2.  Dyspareunia.  3.  Dysuria.  4.  Abnormal uterine bleeding.  5.  Thickened endometrium.  6.  Desires conservative surgical management.  7.  Pelvic adhesive disease.  8.  Likely endometriosis.  9.  Left ovarian cyst.  10.  Interstitial cystitis.  11.  Thickened endometrium, likely endometrial polyps.  Pathology is pending.     PROCEDURES:  Pelvic examination under anesthesia, pelviscopy, lysis of   adhesions, excision of endometriosis, left ovarian cystectomy, placement of   Surgicel, cystoscopy with bladder distention, diagnostic and operative   hysteroscopy, excision of endometrial polyp and endometrial curettage with   oscillating cutting blade.     SURGEON:  Dr. Susan Brown.     ANESTHESIOLOGIST:  Dr. Shaan Puga.     ANESTHESIA:  General endotracheal tube anesthesia and local anesthetic.     SPECIMENS:  1.  Left ovarian cyst tissue.  2.  Endometrial polyps and endometrial curettings.     ESTIMATED BLOOD LOSS:  10 mL.     FINDINGS:  On examination under anesthesia, her uterus was anteverted, mobile,   and not enlarged.  There were no obvious adnexal masses.  Cervix is without   masses.  An attempt was made to use the 6 MYRNA manipulator, but the balloon   was not able to stay in place and the manipulator kept pushing itself out of   the uterine cavity.  Thus, a Hulka tenaculum was placed.     LAPAROSCOPIC FINDINGS:  Bilateral pelvic and abdominal adhesive disease.    Normal upper abdomen and appendix.  Normal uterus.  Normal bilateral fallopian   tubes.  Left ovary with a 5-cm serous fluid-filled ovarian cyst.  Normal   right ovary, but with a small pinpoint endometriosis powder burn implant   noted.  Bilateral ureters were visualized.  Pelvic  peritoneum was   erythematous, likely consistent with inflammation.  Otherwise,   normal-appearing anterior and posterior cul-de-sac and bilateral uterosacral   ligaments without obvious endometriosis implants.     CYSTOSCOPIC FINDINGS:  Bladder distended with 400 mL of saline and erythema   and dilated vasculature noted consistent with interstitial cystitis.    Bilateral ureteral orifices were visualized and clear urine is noted from both   ureteral orifices.     HYSTEROSCOPIC FINDINGS:  Plush endometrium noted consistent with endometrial   polyps.  No obvious concerning masses were visualized.  Bilateral tubal ostia   were not well visualized secondary to plush endometrium.  MyoSure Lite   procedure was performed.  After uterine polypectomy and curettage, the tubal   ostia were better visualized.  90 mL deficit.  Cervix was hemostatic after the   procedure.     COMPLICATIONS:  None apparent.     INDICATIONS FOR THE PROCEDURE:  The patient is a 35-year-old para 2, 0-0-2   sexually active woman with the last menstrual period of 05/11/2025 who has a   long history of dysmenorrhea, dyspareunia, and dysuria and who desires   conservative surgical management.     DETAILS OF THE PROCEDURE:  The patient was taken to the operating room where   she underwent general endotracheal tube anesthesia.  She was then placed in   the dorsal lithotomy position in the Timmy Yellowfin stirrups.  A pelvic   examination under anesthesia was performed and findings documented above.  She   was then prepped and draped in the dorsal lithotomy position.  A Tobar   catheter was placed in the urinary bladder.  Medium-grade speculum was   inserted.  The cervix was visualized and the anterior lip of the cervix was   grasped with an Allis clamp and the endocervix was dilated to allow placement   of the 6 MYRNA manipulator.  Multiple attempts were made to inflate the MYRNA   balloon, but it kept delivering through the cervix and a Hulka tenaculum    manipulator was used instead.     Attention was turned to the laparoscopic portion of the procedure.  A 10-mm   infraumbilical skin incision was made with a scalpel after the installation of   0.25% Marcaine with epinephrine.  The incision was carried down to the level   of the fascia.  The fascia was grasped with Kocher clamps, elevated, and   incised with Bartholomew scissors.  The incision was extended laterally with the Bartholomew   scissors and either side of the fascial incision was sutured with 0 Vicryl as   stay sutures.  The S retractor was placed within these incisions and the   peritoneum was identified, grasped with the Pean clamp, elevated, and incised   with Metzenbaum scissors.  The incision was extended laterally with Metzenbaum   scissors.  The S retractor was placed within these incisions and   intraabdominal entry was confirmed.  Bowel and omentum was visualized.  The   Claudia trocar was inserted under direct visualization.  The CO2 gas was   connected and the opening pressure was 8 mmHg.  The patient was placed in   steep Trendelenburg and the pelvis and upper abdomen were examined and   findings documented above.  A second trocar site was identified 3   fingerbreadths above the pubic symphysis.  A 5-mm skin incision was made with   the scalpel after the instillation of 0.25% Marcaine with epinephrine.  The   5-mm trocar was inserted under direct visualization.  The uterus was elevated   and deviated bilaterally to visualize the bilateral ureters.  Findings in the   pelvis are documented above.  The left ovary with ovarian cyst was visualized,   grasped with the Prestige clamp, and brought to the midline.  Using the   LigaSure, an ovarian cystectomy was performed and the tissue was sent to   Pathology.  The remainder of the ovary was visualized and irrigated with   saline.  Lysis of adhesions was commenced bilaterally using the LigaSure with   sharp dissection, blunt dissection with the blunt probe, and  hydrodissection   with irrigation.  The right ovary was visualized and there was a small area of   endometriosis on the cortex of the ovary and this was cauterized with   electrocautery using the LigaSure.  The pelvis was irrigated with saline and   areas of adhesiolysis and the ovarian cystectomy on the left and the   endometriosis fulguration on the right were treated with Surgicel.  The   trocars were removed under direct visualization.  The fascia was   reapproximated using the stay sutures and a single figure-of-X of 0 Vicryl   suture was used to bring the skin edges into closer proximity.  The skin was   closed with 4-0 Monocryl in a subcuticular fashion.  The 5-mm subcutaneous   tissues were reapproximated with 0 Vicryl.  To bring the skin edges into   closer proximity, the skin was reapproximated with 4-0 Vicryl in a   subcuticular fashion.  The incisions were dressed with benzoin, Steri-Strips,   2 x 2, and Tegaderm.     Attention was turned to the cystoscopic portion of the procedure.  The Tobar   catheter was removed from the patient's urinary bladder and 300 mL of clear   urine were noted.  The 30-degree cystoscope was inserted and 400 mL of saline   was instilled.  Findings are documented above.  The bladder was then drained.     The Hulka tenaculum was removed from the patient's cervix.  The speculum was   inserted.  The cervix was grasped with an Allis clamp and a paracervical block   was performed using 10 mL of 0.25% Marcaine with epinephrine.  The endocervix   was dilated to allow placement of the MyoSure Lite camera.  The MyoSure light   was inserted under direct visualization.  Findings documented above.  The   oscillating cutting blade was inserted under direct visualization and an   endometrial polypectomy and curettage was performed.  Tissue was sent to   Pathology.  Before and after pictures were taken.  The hysteroscope and   MyoSure oscillating cutting blade were removed from the uterine  cavity.  The   Allis clamp was removed.  The cervix was visualized and found to be   hemostatic.  The speculum was removed.  The patient was taken out of the   dorsal lithotomy position.  She was then extubated and taken to the PACU in   stable condition.  Sponge, lap, needle, and instrument counts were correct x2.        ______________________________  MD TAMIA Cabrera/VERONICA    DD:  06/03/2025 14:58  DT:  06/03/2025 17:21    Job#:  [id number]

## (undated) DEVICE — SODIUM CHL. IRRIGATION 0.9% 3000ML (4EA/CA 65CA/PF)

## (undated) DEVICE — Device

## (undated) DEVICE — TROCAR LAPSCP 100MM 12MM NTHRD - (6/BX)

## (undated) DEVICE — TUBE CONNECTING SUCTION - CLEAR PLASTIC STERILE 72 IN (50EA/CA)

## (undated) DEVICE — SET SUCTION/IRRIGATION WITH DISPOSABLE TIP (6/CA )PART #0250-070-520 IS A SUB

## (undated) DEVICE — SUTURE 4-0 MONOCRYL PLUS PS-2 - 27 INCH (36/BX)

## (undated) DEVICE — SPONGE GAUZESTER. 2X2 4-PL - (2/PK 50PK/BX 30BX/CS)

## (undated) DEVICE — CANISTER SUCTION 3000ML MECHANICAL FILTER AUTO SHUTOFF MEDI-VAC NONSTERILE LF DISP (40EA/CA)

## (undated) DEVICE — PAD SANITARY 11IN MAXI IND WRAPPED (12EA/PK 24PK/CA)

## (undated) DEVICE — TOWEL STOP TIMEOUT SAFETY FLAG (40EA/CA)

## (undated) DEVICE — SET LEADWIRE 5 LEAD BEDSIDE DISPOSABLE ECG (1SET OF 5/EA)

## (undated) DEVICE — ADHESIVE MASTISOL - (48/BX)

## (undated) DEVICE — SUCTION INSTRUMENT YANKAUER BULBOUS TIP W/O VENT (50EA/CA)

## (undated) DEVICE — UTERINE MANIP RUMI 6.7X6 - (5/BX)

## (undated) DEVICE — HEMOSTAT ABSORBABLE POWDER SURGICEL 3G (5EA/BX)

## (undated) DEVICE — DEVICE REMOVAL MYOSURE LITE TISSUE (3EA/BX)

## (undated) DEVICE — APPLICATOR ENDOSCOPIC SURGICEL (5EA/BX)

## (undated) DEVICE — GLOVE BIOGEL INDICATOR SZ 7SURGICAL PF LTX - (50/BX 4BX/CA)

## (undated) DEVICE — GLOVE BIOGEL INDICATOR SZ 6.5 SURGICAL PF LTX - (50PR/BX 4BX/CA)

## (undated) DEVICE — CANNULA O2 COMFORT SOFT EAR ADULT 7 FT TUBING (50/CA)

## (undated) DEVICE — GLOVE SZ 6 BIOGEL PI MICRO - PF LF (50PR/BX 4BX/CA)

## (undated) DEVICE — CANISTER SUCTION RIGID RED 1500CC (40EA/CA)

## (undated) DEVICE — SET IRRIGATION CYSTOSCOPY TUBE L80 IN (20EA/CA)

## (undated) DEVICE — GLOVE BIOGEL SZ 6.5 SURGICAL PF LTX (50PR/BX 4BX/CA)

## (undated) DEVICE — SPECIMEN PLASTIC CONVERTOR - (10/CA)

## (undated) DEVICE — SET TUBING AQUILEX OUT-FLOW MYOSURE (10EA/BX)

## (undated) DEVICE — SODIUM CHL IRRIGATION 0.9% 1000ML (12EA/CA)

## (undated) DEVICE — SYRINGE 10 ML CONTROL LL (25EA/BX 4BX/CA)

## (undated) DEVICE — SET TUBING AQUILEX IN-FLOW MYOSURE (10EA/BX)

## (undated) DEVICE — SUTURE 0 VICRYL PLUS UR-6 - 27 INCH (36/BX)

## (undated) DEVICE — SUTURE GENERAL

## (undated) DEVICE — JELLY SURGILUBE STERILE TUBE 4.25 OZ (1/EA)

## (undated) DEVICE — LACTATED RINGERS INJ 1000 ML - (14EA/CA 60CA/PF)

## (undated) DEVICE — DRESSING TRANSPARENT FILM TEGADERM 2.375 X 2.75" (100EA/BX)"

## (undated) DEVICE — SET TUBING PNEUMOCLEAR HIGH FLOW SMOKE EVACUATION (10EA/BX)

## (undated) DEVICE — MASK OXYGEN VNYL ADLT MED CONC WITH 7 FOOT TUBING - (50EA/CA)

## (undated) DEVICE — CLOSURE SKIN STRIP 1/2 X 4 IN - (STERI STRIP) (50/BX 4BX/CA)

## (undated) DEVICE — GOWN WARMING STANDARD FLEX - (30/CA)

## (undated) DEVICE — NEEDLE SPINAL NON-SAFETY 22 GA X 3 (25EA/BX)"

## (undated) DEVICE — TROCAR 5X100 BLADED ADVANCE - FIXATION (6/BX)

## (undated) DEVICE — SLEEVE VASO DVT COMPRESSION CALF MED - (10PR/CA)

## (undated) DEVICE — ELECTRODE DUAL RETURN W/ CORD - (50/PK)

## (undated) DEVICE — WATER IRRIGATION STERILE 1000ML (12EA/CA)

## (undated) DEVICE — TRAY FOLEY CATHETER STATLOCK 16FR SURESTEP (10EA/CA)

## (undated) DEVICE — DRAPESURG STERI-DRAPE LONG - (10/BX 4BX/CA)

## (undated) DEVICE — TUBING CLEARLINK DUO-VENT - C-FLO (48EA/CA)

## (undated) DEVICE — SENSOR OXIMETER ADULT SPO2 RD SET (20EA/BX)

## (undated) DEVICE — SUTURE 3-0 VICRYL PLUS CT-1 - 36 INCH (36/BX)

## (undated) DEVICE — KIT  I.V. START (100EA/CA)

## (undated) DEVICE — SYSTEM CLEARIFY VISUALIZATION (10EA/PK)